# Patient Record
Sex: MALE | Race: BLACK OR AFRICAN AMERICAN | NOT HISPANIC OR LATINO | Employment: FULL TIME | URBAN - METROPOLITAN AREA
[De-identification: names, ages, dates, MRNs, and addresses within clinical notes are randomized per-mention and may not be internally consistent; named-entity substitution may affect disease eponyms.]

---

## 2018-05-30 ENCOUNTER — HISTORICAL (OUTPATIENT)
Dept: RADIOLOGY | Facility: HOSPITAL | Age: 48
End: 2018-05-30

## 2018-11-21 LAB — RAPID GROUP A STREP (OHS): NEGATIVE

## 2018-12-17 LAB
INFLUENZA A ANTIGEN, POC: NEGATIVE
INFLUENZA B ANTIGEN, POC: NEGATIVE
RAPID GROUP A STREP (OHS): NEGATIVE

## 2021-05-08 LAB
INFLUENZA A ANTIGEN, POC: POSITIVE
INFLUENZA B ANTIGEN, POC: NEGATIVE
RAPID GROUP A STREP (OHS): NEGATIVE
SARS-COV-2 RNA RESP QL NAA+PROBE: NEGATIVE

## 2021-09-10 ENCOUNTER — HISTORICAL (OUTPATIENT)
Dept: ADMINISTRATIVE | Facility: HOSPITAL | Age: 51
End: 2021-09-10

## 2021-09-10 LAB — SARS-COV-2 RNA RESP QL NAA+PROBE: NEGATIVE

## 2021-11-22 LAB
INFLUENZA A ANTIGEN, POC: NEGATIVE
INFLUENZA B ANTIGEN, POC: NEGATIVE
SARS-COV-2 RNA RESP QL NAA+PROBE: NEGATIVE

## 2022-04-11 ENCOUNTER — HISTORICAL (OUTPATIENT)
Dept: ADMINISTRATIVE | Facility: HOSPITAL | Age: 52
End: 2022-04-11

## 2022-04-28 VITALS
BODY MASS INDEX: 31.5 KG/M2 | OXYGEN SATURATION: 98 % | SYSTOLIC BLOOD PRESSURE: 119 MMHG | DIASTOLIC BLOOD PRESSURE: 74 MMHG | HEIGHT: 70 IN | WEIGHT: 220 LBS

## 2022-07-24 ENCOUNTER — OFFICE VISIT (OUTPATIENT)
Dept: URGENT CARE | Facility: CLINIC | Age: 52
End: 2022-07-24
Payer: COMMERCIAL

## 2022-07-24 VITALS
RESPIRATION RATE: 20 BRPM | WEIGHT: 222 LBS | TEMPERATURE: 102 F | HEART RATE: 89 BPM | HEIGHT: 70 IN | SYSTOLIC BLOOD PRESSURE: 143 MMHG | BODY MASS INDEX: 31.78 KG/M2 | DIASTOLIC BLOOD PRESSURE: 64 MMHG | OXYGEN SATURATION: 97 %

## 2022-07-24 DIAGNOSIS — R06.6 HICCUPS: ICD-10-CM

## 2022-07-24 DIAGNOSIS — U07.1 COVID-19: ICD-10-CM

## 2022-07-24 DIAGNOSIS — U07.1 COVID-19 VIRUS DETECTED: ICD-10-CM

## 2022-07-24 DIAGNOSIS — R50.9 FEVER, UNSPECIFIED FEVER CAUSE: Primary | ICD-10-CM

## 2022-07-24 LAB
CTP QC/QA: YES
CTP QC/QA: YES
FLUAV AG NPH QL: NEGATIVE
FLUBV AG NPH QL: NEGATIVE
SARS-COV-2 RDRP RESP QL NAA+PROBE: POSITIVE

## 2022-07-24 PROCEDURE — 3008F PR BODY MASS INDEX (BMI) DOCUMENTED: ICD-10-PCS | Mod: CPTII,,, | Performed by: PHYSICIAN ASSISTANT

## 2022-07-24 PROCEDURE — 3077F SYST BP >= 140 MM HG: CPT | Mod: CPTII,,, | Performed by: PHYSICIAN ASSISTANT

## 2022-07-24 PROCEDURE — 99202 PR OFFICE/OUTPT VISIT, NEW, LEVL II, 15-29 MIN: ICD-10-PCS | Mod: 25,,, | Performed by: PHYSICIAN ASSISTANT

## 2022-07-24 PROCEDURE — U0002: ICD-10-PCS | Mod: QW,,, | Performed by: PHYSICIAN ASSISTANT

## 2022-07-24 PROCEDURE — 3078F PR MOST RECENT DIASTOLIC BLOOD PRESSURE < 80 MM HG: ICD-10-PCS | Mod: CPTII,,, | Performed by: PHYSICIAN ASSISTANT

## 2022-07-24 PROCEDURE — 96372 PR INJECTION,THERAP/PROPH/DIAG2ST, IM OR SUBCUT: ICD-10-PCS | Mod: ,,, | Performed by: PHYSICIAN ASSISTANT

## 2022-07-24 PROCEDURE — 1160F RVW MEDS BY RX/DR IN RCRD: CPT | Mod: CPTII,,, | Performed by: PHYSICIAN ASSISTANT

## 2022-07-24 PROCEDURE — 87804 POCT INFLUENZA A/B: ICD-10-PCS | Mod: 59,QW,, | Performed by: PHYSICIAN ASSISTANT

## 2022-07-24 PROCEDURE — 3008F BODY MASS INDEX DOCD: CPT | Mod: CPTII,,, | Performed by: PHYSICIAN ASSISTANT

## 2022-07-24 PROCEDURE — U0002 COVID-19 LAB TEST NON-CDC: HCPCS | Mod: QW,,, | Performed by: PHYSICIAN ASSISTANT

## 2022-07-24 PROCEDURE — 3077F PR MOST RECENT SYSTOLIC BLOOD PRESSURE >= 140 MM HG: ICD-10-PCS | Mod: CPTII,,, | Performed by: PHYSICIAN ASSISTANT

## 2022-07-24 PROCEDURE — 3078F DIAST BP <80 MM HG: CPT | Mod: CPTII,,, | Performed by: PHYSICIAN ASSISTANT

## 2022-07-24 PROCEDURE — 87804 INFLUENZA ASSAY W/OPTIC: CPT | Mod: QW,,, | Performed by: PHYSICIAN ASSISTANT

## 2022-07-24 PROCEDURE — 1159F MED LIST DOCD IN RCRD: CPT | Mod: CPTII,,, | Performed by: PHYSICIAN ASSISTANT

## 2022-07-24 PROCEDURE — 1159F PR MEDICATION LIST DOCUMENTED IN MEDICAL RECORD: ICD-10-PCS | Mod: CPTII,,, | Performed by: PHYSICIAN ASSISTANT

## 2022-07-24 PROCEDURE — 99202 OFFICE O/P NEW SF 15 MIN: CPT | Mod: 25,,, | Performed by: PHYSICIAN ASSISTANT

## 2022-07-24 PROCEDURE — 96372 THER/PROPH/DIAG INJ SC/IM: CPT | Mod: ,,, | Performed by: PHYSICIAN ASSISTANT

## 2022-07-24 PROCEDURE — 1160F PR REVIEW ALL MEDS BY PRESCRIBER/CLIN PHARMACIST DOCUMENTED: ICD-10-PCS | Mod: CPTII,,, | Performed by: PHYSICIAN ASSISTANT

## 2022-07-24 RX ORDER — METHYLPREDNISOLONE 4 MG/1
TABLET ORAL
Qty: 1 EACH | Refills: 0 | Status: SHIPPED | OUTPATIENT
Start: 2022-07-24 | End: 2023-10-20

## 2022-07-24 RX ORDER — DEXAMETHASONE SODIUM PHOSPHATE 100 MG/10ML
10 INJECTION INTRAMUSCULAR; INTRAVENOUS
Status: COMPLETED | OUTPATIENT
Start: 2022-07-24 | End: 2022-07-24

## 2022-07-24 RX ORDER — CHLORPROMAZINE HYDROCHLORIDE 25 MG/1
25 TABLET, FILM COATED ORAL 3 TIMES DAILY PRN
Qty: 9 TABLET | Refills: 0 | Status: SHIPPED | OUTPATIENT
Start: 2022-07-24 | End: 2022-07-27

## 2022-07-24 RX ORDER — LISINOPRIL 30 MG/1
30 TABLET ORAL DAILY
COMMUNITY

## 2022-07-24 RX ORDER — TRAZODONE HYDROCHLORIDE 100 MG/1
100 TABLET ORAL NIGHTLY
COMMUNITY

## 2022-07-24 RX ORDER — ZOLPIDEM TARTRATE 10 MG/1
5 TABLET ORAL NIGHTLY PRN
COMMUNITY

## 2022-07-24 RX ORDER — HYDROCHLOROTHIAZIDE 25 MG/1
25 TABLET ORAL DAILY
COMMUNITY
End: 2023-10-20

## 2022-07-24 RX ADMIN — DEXAMETHASONE SODIUM PHOSPHATE 10 MG: 100 INJECTION INTRAMUSCULAR; INTRAVENOUS at 03:07

## 2022-07-24 NOTE — PATIENT INSTRUCTIONS
COVID Positive  Start vitamin C 1000mg twice a day, zinc 100mg once a day, and vitamin D3 at least 2000 units a day. Current CDC guidelines recommend that you quarantine for 5 days starting the day after your symptoms began. Quarantine can end after 5 days as long as the last 24 hours of quarantine you are fever free and there is improvement of all your symptoms. Wear a mask around others for 5 additional days after quarantine. Treat your symptoms as you would the common cold. If you live with anybody, isolate yourself in a separate bedroom and use a separate bathroom. If your symptoms worsen or you develop shortness of breath or a fever over 102.5 , seek medical attention immediately.    Alternate Tylenol and ibuprofen every 4-6 hours to help reduce use aches pain fever and chills.  Medrol Dosepak starting tomorrow to help reduce congestion inflammation.  Chlorpromazine   Sparingly lows dose is needed for severe hiccups.   Medication may cause drowsiness do not take and drive or operate machinery.  Recommend follow-up with primary care physician in 5-7 days for re-evaluation if not improving.

## 2022-07-24 NOTE — PROGRESS NOTES
"Subjective:       Patient ID: Hang Mix is a 51 y.o. male.    Vitals:  height is 5' 10" (1.778 m) and weight is 100.7 kg (222 lb). His oral temperature is 102.2 °F (39 °C) (abnormal). His blood pressure is 143/64 (abnormal) and his pulse is 89. His respiration is 20 and oxygen saturation is 97%.     Chief Complaint: Sinus Problem (Started yesterday )    HPI  Male with viral sick contaqct at work 4 days ago now with sinus congestion onset yesterday, body aches and fever with hiccups today       Sinus Problem  The maximum temperature recorded prior to his arrival was 101 - 101.9 F. Associated symptoms include chills, congestion, coughing and headaches. Pertinent negatives include no ear pain, neck pain or shortness of breath.       Constitution: Positive for chills and fever.   HENT: Positive for congestion. Negative for ear pain, sinus pain, trouble swallowing and voice change.    Neck: Negative for neck pain and neck stiffness.   Cardiovascular: Negative.    Respiratory: Positive for cough. Negative for shortness of breath and wheezing.    Gastrointestinal: Negative for vomiting and diarrhea.   Musculoskeletal: Positive for muscle ache.   Skin: Negative.    Allergic/Immunologic: Negative.    Neurological: Positive for headaches. Negative for dizziness and passing out.       Objective:      Physical Exam   Constitutional: He is oriented to person, place, and time. He appears well-developed. He is cooperative.  Non-toxic appearance. He does not appear ill. No distress.      Comments:Pleasant male speaks in complete sentences with active hiccups     HENT:   Head: Normocephalic.   Ears:   Right Ear: Hearing, tympanic membrane, external ear and ear canal normal.   Left Ear: Hearing, tympanic membrane, external ear and ear canal normal.   Nose: Congestion present. No mucosal edema, rhinorrhea or nasal deformity. No epistaxis. Right sinus exhibits no maxillary sinus tenderness and no frontal sinus tenderness. " Left sinus exhibits no maxillary sinus tenderness and no frontal sinus tenderness.   Mouth/Throat: Uvula is midline, oropharynx is clear and moist and mucous membranes are normal. Mucous membranes are moist. No trismus in the jaw. Normal dentition. No uvula swelling. No oropharyngeal exudate, posterior oropharyngeal edema or posterior oropharyngeal erythema.   Eyes: Conjunctivae and lids are normal. No scleral icterus.   Neck: Trachea normal and phonation normal. Neck supple. No edema present. No erythema present. No neck rigidity present.   Cardiovascular: Normal rate, regular rhythm, normal heart sounds and normal pulses.   Pulmonary/Chest: Effort normal and breath sounds normal. No stridor. He has no decreased breath sounds. He has no wheezes. He has no rhonchi. He has no rales.   Abdominal: Normal appearance.   Musculoskeletal: Normal range of motion.         General: No swelling. Normal range of motion.   Neurological: He is alert and oriented to person, place, and time. He exhibits normal muscle tone. Coordination normal.   Skin: Skin is warm, dry, intact and not diaphoretic.   Psychiatric: His speech is normal and behavior is normal. Judgment and thought content normal.   Nursing note and vitals reviewed.             Previous History      Review of patient's allergies indicates:  No Known Allergies    Past Medical History:   Diagnosis Date    Hypertension     Insomnia      Current Outpatient Medications   Medication Instructions    chlorproMAZINE (THORAZINE) 25 mg, Oral, 3 times daily PRN    hydroCHLOROthiazide (HYDRODIURIL) 25 mg, Oral, Daily    lisinopriL (PRINIVIL,ZESTRIL) 30 mg, Oral, Daily    methylPREDNISolone (MEDROL DOSEPACK) 4 mg tablet use as directed    traZODone (DESYREL) 100 mg, Oral, Nightly    zolpidem (AMBIEN) 5 mg, Oral, Nightly PRN     History reviewed. No pertinent surgical history.  Family History   Problem Relation Age of Onset    Diabetes type II Father        Social History  "    Tobacco Use    Smoking status: Never Smoker    Smokeless tobacco: Never Used        Physical Exam      Vital Signs Reviewed   BP (!) 143/64   Pulse 89   Temp (!) 102.2 °F (39 °C) (Oral)   Resp 20   Ht 5' 10" (1.778 m)   Wt 100.7 kg (222 lb)   SpO2 97%   BMI 31.85 kg/m²        Procedures    Procedures     Labs     Results for orders placed or performed in visit on 07/24/22   POCT COVID-19 Rapid Screening   Result Value Ref Range    POC Rapid COVID Positive (A) Negative     Acceptable Yes    POCT Influenza A/B   Result Value Ref Range    Rapid Influenza A Ag Negative Negative    Rapid Influenza B Ag Negative Negative     Acceptable Yes        Assessment:       1. Fever, unspecified fever cause    2. COVID-19    3. Hiccups          Plan:         Patient declines Tylenol wall and clinic for fever.  Patient stands positive viral testing concern for COVID infection.  Patient with spasmodic active hiccups offered and accepts prescription medication.     Patient readyfor discharge in stands home quarantine OTC symptomatic Rx and follow-up.  Fever, unspecified fever cause  -     POCT COVID-19 Rapid Screening  -     POCT Influenza A/B    COVID-19  -     methylPREDNISolone (MEDROL DOSEPACK) 4 mg tablet; use as directed  Dispense: 1 each; Refill: 0    Hiccups  -     chlorproMAZINE (THORAZINE) 25 MG tablet; Take 1 tablet (25 mg total) by mouth 3 (three) times daily as needed (hiccups).  Dispense: 9 tablet; Refill: 0    Other orders  -     dexamethasone injection 10 mg                   "

## 2022-09-21 ENCOUNTER — HISTORICAL (OUTPATIENT)
Dept: ADMINISTRATIVE | Facility: HOSPITAL | Age: 52
End: 2022-09-21
Payer: COMMERCIAL

## 2023-01-14 ENCOUNTER — OFFICE VISIT (OUTPATIENT)
Dept: URGENT CARE | Facility: CLINIC | Age: 53
End: 2023-01-14
Payer: COMMERCIAL

## 2023-01-14 VITALS
DIASTOLIC BLOOD PRESSURE: 81 MMHG | TEMPERATURE: 99 F | HEIGHT: 70 IN | OXYGEN SATURATION: 96 % | RESPIRATION RATE: 18 BRPM | BODY MASS INDEX: 31.92 KG/M2 | HEART RATE: 76 BPM | SYSTOLIC BLOOD PRESSURE: 144 MMHG | WEIGHT: 223 LBS

## 2023-01-14 DIAGNOSIS — R09.81 NASAL CONGESTION: ICD-10-CM

## 2023-01-14 DIAGNOSIS — R05.9 COUGH, UNSPECIFIED TYPE: Primary | ICD-10-CM

## 2023-01-14 DIAGNOSIS — J02.9 SORE THROAT: ICD-10-CM

## 2023-01-14 DIAGNOSIS — J06.9 VIRAL UPPER RESPIRATORY TRACT INFECTION: ICD-10-CM

## 2023-01-14 LAB
CTP QC/QA: YES
FLUAV AG NPH QL: NEGATIVE
FLUBV AG NPH QL: NEGATIVE
S PYO RRNA THROAT QL PROBE: NEGATIVE
SARS-COV-2 RDRP RESP QL NAA+PROBE: NEGATIVE

## 2023-01-14 PROCEDURE — 3077F PR MOST RECENT SYSTOLIC BLOOD PRESSURE >= 140 MM HG: ICD-10-PCS | Mod: CPTII,,, | Performed by: NURSE PRACTITIONER

## 2023-01-14 PROCEDURE — 1159F MED LIST DOCD IN RCRD: CPT | Mod: CPTII,,, | Performed by: NURSE PRACTITIONER

## 2023-01-14 PROCEDURE — 87804 POCT INFLUENZA A/B: ICD-10-PCS | Mod: QW,,, | Performed by: NURSE PRACTITIONER

## 2023-01-14 PROCEDURE — 3079F PR MOST RECENT DIASTOLIC BLOOD PRESSURE 80-89 MM HG: ICD-10-PCS | Mod: CPTII,,, | Performed by: NURSE PRACTITIONER

## 2023-01-14 PROCEDURE — 96372 THER/PROPH/DIAG INJ SC/IM: CPT | Mod: ,,, | Performed by: NURSE PRACTITIONER

## 2023-01-14 PROCEDURE — 99214 OFFICE O/P EST MOD 30 MIN: CPT | Mod: 25,,, | Performed by: NURSE PRACTITIONER

## 2023-01-14 PROCEDURE — 96372 PR INJECTION,THERAP/PROPH/DIAG2ST, IM OR SUBCUT: ICD-10-PCS | Mod: ,,, | Performed by: NURSE PRACTITIONER

## 2023-01-14 PROCEDURE — 3008F PR BODY MASS INDEX (BMI) DOCUMENTED: ICD-10-PCS | Mod: CPTII,,, | Performed by: NURSE PRACTITIONER

## 2023-01-14 PROCEDURE — 87635: ICD-10-PCS | Mod: QW,,, | Performed by: NURSE PRACTITIONER

## 2023-01-14 PROCEDURE — 3008F BODY MASS INDEX DOCD: CPT | Mod: CPTII,,, | Performed by: NURSE PRACTITIONER

## 2023-01-14 PROCEDURE — 87880 STREP A ASSAY W/OPTIC: CPT | Mod: QW,,, | Performed by: NURSE PRACTITIONER

## 2023-01-14 PROCEDURE — 87804 INFLUENZA ASSAY W/OPTIC: CPT | Mod: QW,,, | Performed by: NURSE PRACTITIONER

## 2023-01-14 PROCEDURE — 3077F SYST BP >= 140 MM HG: CPT | Mod: CPTII,,, | Performed by: NURSE PRACTITIONER

## 2023-01-14 PROCEDURE — 1159F PR MEDICATION LIST DOCUMENTED IN MEDICAL RECORD: ICD-10-PCS | Mod: CPTII,,, | Performed by: NURSE PRACTITIONER

## 2023-01-14 PROCEDURE — 87635 SARS-COV-2 COVID-19 AMP PRB: CPT | Mod: QW,,, | Performed by: NURSE PRACTITIONER

## 2023-01-14 PROCEDURE — 3079F DIAST BP 80-89 MM HG: CPT | Mod: CPTII,,, | Performed by: NURSE PRACTITIONER

## 2023-01-14 PROCEDURE — 1160F PR REVIEW ALL MEDS BY PRESCRIBER/CLIN PHARMACIST DOCUMENTED: ICD-10-PCS | Mod: CPTII,,, | Performed by: NURSE PRACTITIONER

## 2023-01-14 PROCEDURE — 99214 PR OFFICE/OUTPT VISIT, EST, LEVL IV, 30-39 MIN: ICD-10-PCS | Mod: 25,,, | Performed by: NURSE PRACTITIONER

## 2023-01-14 PROCEDURE — 1160F RVW MEDS BY RX/DR IN RCRD: CPT | Mod: CPTII,,, | Performed by: NURSE PRACTITIONER

## 2023-01-14 PROCEDURE — 87880 POCT RAPID STREP A: ICD-10-PCS | Mod: QW,,, | Performed by: NURSE PRACTITIONER

## 2023-01-14 RX ORDER — ANASTROZOLE 1 MG/1
1 TABLET ORAL
COMMUNITY
Start: 2022-12-20

## 2023-01-14 RX ORDER — BETAMETHASONE SODIUM PHOSPHATE AND BETAMETHASONE ACETATE 3; 3 MG/ML; MG/ML
12 INJECTION, SUSPENSION INTRA-ARTICULAR; INTRALESIONAL; INTRAMUSCULAR; SOFT TISSUE
Status: COMPLETED | OUTPATIENT
Start: 2023-01-14 | End: 2023-01-14

## 2023-01-14 RX ORDER — CELECOXIB 200 MG/1
200 CAPSULE ORAL DAILY PRN
COMMUNITY
Start: 2022-11-22 | End: 2023-10-19

## 2023-01-14 RX ORDER — TESTOSTERONE CYPIONATE 200 MG/ML
200 INJECTION, SOLUTION INTRAMUSCULAR
COMMUNITY
Start: 2022-12-27

## 2023-01-14 RX ADMIN — BETAMETHASONE SODIUM PHOSPHATE AND BETAMETHASONE ACETATE 12 MG: 3; 3 INJECTION, SUSPENSION INTRA-ARTICULAR; INTRALESIONAL; INTRAMUSCULAR; SOFT TISSUE at 01:01

## 2023-01-14 NOTE — PATIENT INSTRUCTIONS
Nasal saline, Flonase nasal spray, Claritin OTC as directed  take Mucinex OTC for cough as directed   follow up with your primary care provider within 2-5 days if your signs and symptoms have not resolved or worsen.   If condition worsens or fails to improve we recommend that you receive another evaluation with your primary medical clinic to discuss your concerns.

## 2023-01-14 NOTE — LETTER
January 14, 2023      [facility] [place]  [address]  [place] [address]  Phone: [phone]       Patient: Hang Mix   YOB: 1970  Date of Visit: 01/14/2023    To Whom It May Concern:    Tania Mix  was at [facility] on 01/14/2023. The patient may return to work/school on 01/16/2023 with no restrictions. If you have any questions or concerns, or if I can be of further assistance, please do not hesitate to contact me.    Sincerely,    Sherry Eden LPN

## 2023-01-14 NOTE — PROGRESS NOTES
"Subjective:       Patient ID: Hang Mix is a 52 y.o. male.    Vitals:  height is 5' 10" (1.778 m) and weight is 101.2 kg (223 lb). His oral temperature is 98.7 °F (37.1 °C). His blood pressure is 144/81 (abnormal) and his pulse is 76. His respiration is 18 and oxygen saturation is 96%.     Chief Complaint: Cough (Cough, sore throat, sinus drip x one week. )    52-year-old male presents with sore throat, sinus congestion, and mild cough.  Onset 1 week ago.  No shortness of breath or fever    HENT:  Positive for postnasal drip and sore throat.    Respiratory:  Positive for cough.      Objective:      Physical Exam   Constitutional: He is oriented to person, place, and time. He appears well-developed. He is cooperative.  Non-toxic appearance. He does not appear ill. No distress.   HENT:   Head: Normocephalic and atraumatic.   Ears:   Right Ear: Hearing, tympanic membrane, external ear and ear canal normal.   Left Ear: Hearing, tympanic membrane, external ear and ear canal normal.   Nose: Nose normal. No mucosal edema, rhinorrhea or nasal deformity. No epistaxis. Right sinus exhibits no maxillary sinus tenderness and no frontal sinus tenderness. Left sinus exhibits no maxillary sinus tenderness and no frontal sinus tenderness.   Mouth/Throat: Uvula is midline, oropharynx is clear and moist and mucous membranes are normal. No trismus in the jaw. Normal dentition. No uvula swelling. No oropharyngeal exudate, posterior oropharyngeal edema or posterior oropharyngeal erythema.   Eyes: Conjunctivae and lids are normal. No scleral icterus.   Neck: Trachea normal and phonation normal. Neck supple. No edema present. No erythema present. No neck rigidity present.   Cardiovascular: Normal rate, regular rhythm, normal heart sounds and normal pulses.   Pulmonary/Chest: Effort normal and breath sounds normal. No respiratory distress. He has no decreased breath sounds. He has no rhonchi.   Abdominal: Normal appearance. "   Musculoskeletal: Normal range of motion.         General: No deformity. Normal range of motion.   Neurological: He is alert and oriented to person, place, and time. He exhibits normal muscle tone. Coordination normal.   Skin: Skin is warm, dry, intact, not diaphoretic and not pale.   Psychiatric: His speech is normal and behavior is normal. Judgment and thought content normal.   Nursing note and vitals reviewed.      Assessment:       1. Cough, unspecified type    2. Nasal congestion    3. Sore throat    4. Viral upper respiratory tract infection          Office Visit on 01/14/2023   Component Date Value Ref Range Status    POC Rapid COVID 01/14/2023 Negative  Negative Final     Acceptable 01/14/2023 Yes   Final    Rapid Influenza A Ag 01/14/2023 Negative  Negative Final    Rapid Influenza B Ag 01/14/2023 Negative  Negative Final     Acceptable 01/14/2023 Yes   Final    Rapid Strep A Screen 01/14/2023 Negative  Negative Final     Acceptable 01/14/2023 Yes   Final        Plan:     Nasal saline, Flonase nasal spray, Claritin OTC as directed  take Mucinex OTC for cough as directed   follow up with your primary care provider within 2-5 days if your signs and symptoms have not resolved or worsen.   If condition worsens or fails to improve we recommend that you receive another evaluation with your primary medical clinic to discuss your concerns.      Cough, unspecified type  -     POCT COVID-19 Rapid Screening  -     POCT Influenza A/B    Nasal congestion  -     POCT COVID-19 Rapid Screening  -     POCT Influenza A/B    Sore throat  -     POCT COVID-19 Rapid Screening  -     POCT Influenza A/B  -     POCT rapid strep A    Viral upper respiratory tract infection    Other orders  -     betamethasone acetate-betamethasone sodium phosphate injection 12 mg

## 2023-04-19 ENCOUNTER — PATIENT MESSAGE (OUTPATIENT)
Dept: RESEARCH | Facility: HOSPITAL | Age: 53
End: 2023-04-19
Payer: COMMERCIAL

## 2023-07-13 ENCOUNTER — OFFICE VISIT (OUTPATIENT)
Dept: ORTHOPEDICS | Facility: CLINIC | Age: 53
End: 2023-07-13
Payer: COMMERCIAL

## 2023-07-13 ENCOUNTER — HOSPITAL ENCOUNTER (OUTPATIENT)
Dept: RADIOLOGY | Facility: CLINIC | Age: 53
Discharge: HOME | End: 2023-07-13
Attending: ORTHOPAEDIC SURGERY
Payer: COMMERCIAL

## 2023-07-13 VITALS
WEIGHT: 223 LBS | BODY MASS INDEX: 31.92 KG/M2 | TEMPERATURE: 98 F | HEART RATE: 62 BPM | HEIGHT: 70 IN | SYSTOLIC BLOOD PRESSURE: 157 MMHG | DIASTOLIC BLOOD PRESSURE: 96 MMHG

## 2023-07-13 DIAGNOSIS — M25.561 ACUTE PAIN OF RIGHT KNEE: ICD-10-CM

## 2023-07-13 DIAGNOSIS — M23.203 DEGENERATIVE TEAR OF MEDIAL MENISCUS OF RIGHT KNEE: ICD-10-CM

## 2023-07-13 DIAGNOSIS — M17.0 PRIMARY OSTEOARTHRITIS OF BOTH KNEES: Primary | ICD-10-CM

## 2023-07-13 PROCEDURE — 1160F RVW MEDS BY RX/DR IN RCRD: CPT | Mod: CPTII,,, | Performed by: ORTHOPAEDIC SURGERY

## 2023-07-13 PROCEDURE — 20610 LARGE JOINT ASPIRATION/INJECTION: BILATERAL KNEE: ICD-10-PCS | Mod: 50,,, | Performed by: ORTHOPAEDIC SURGERY

## 2023-07-13 PROCEDURE — 73564 XR KNEE COMP 4 OR MORE VIEWS RIGHT: ICD-10-PCS | Mod: RT,,, | Performed by: ORTHOPAEDIC SURGERY

## 2023-07-13 PROCEDURE — 3080F PR MOST RECENT DIASTOLIC BLOOD PRESSURE >= 90 MM HG: ICD-10-PCS | Mod: CPTII,,, | Performed by: ORTHOPAEDIC SURGERY

## 2023-07-13 PROCEDURE — 3008F BODY MASS INDEX DOCD: CPT | Mod: CPTII,,, | Performed by: ORTHOPAEDIC SURGERY

## 2023-07-13 PROCEDURE — 1160F PR REVIEW ALL MEDS BY PRESCRIBER/CLIN PHARMACIST DOCUMENTED: ICD-10-PCS | Mod: CPTII,,, | Performed by: ORTHOPAEDIC SURGERY

## 2023-07-13 PROCEDURE — 4010F PR ACE/ARB THEARPY RXD/TAKEN: ICD-10-PCS | Mod: CPTII,,, | Performed by: ORTHOPAEDIC SURGERY

## 2023-07-13 PROCEDURE — 1159F MED LIST DOCD IN RCRD: CPT | Mod: CPTII,,, | Performed by: ORTHOPAEDIC SURGERY

## 2023-07-13 PROCEDURE — 99204 OFFICE O/P NEW MOD 45 MIN: CPT | Mod: 25,,, | Performed by: ORTHOPAEDIC SURGERY

## 2023-07-13 PROCEDURE — 20610 DRAIN/INJ JOINT/BURSA W/O US: CPT | Mod: 50,,, | Performed by: ORTHOPAEDIC SURGERY

## 2023-07-13 PROCEDURE — 73564 X-RAY EXAM KNEE 4 OR MORE: CPT | Mod: RT,,, | Performed by: ORTHOPAEDIC SURGERY

## 2023-07-13 PROCEDURE — 3080F DIAST BP >= 90 MM HG: CPT | Mod: CPTII,,, | Performed by: ORTHOPAEDIC SURGERY

## 2023-07-13 PROCEDURE — 99204 PR OFFICE/OUTPT VISIT, NEW, LEVL IV, 45-59 MIN: ICD-10-PCS | Mod: 25,,, | Performed by: ORTHOPAEDIC SURGERY

## 2023-07-13 PROCEDURE — 3077F SYST BP >= 140 MM HG: CPT | Mod: CPTII,,, | Performed by: ORTHOPAEDIC SURGERY

## 2023-07-13 PROCEDURE — 1159F PR MEDICATION LIST DOCUMENTED IN MEDICAL RECORD: ICD-10-PCS | Mod: CPTII,,, | Performed by: ORTHOPAEDIC SURGERY

## 2023-07-13 PROCEDURE — 4010F ACE/ARB THERAPY RXD/TAKEN: CPT | Mod: CPTII,,, | Performed by: ORTHOPAEDIC SURGERY

## 2023-07-13 PROCEDURE — 3077F PR MOST RECENT SYSTOLIC BLOOD PRESSURE >= 140 MM HG: ICD-10-PCS | Mod: CPTII,,, | Performed by: ORTHOPAEDIC SURGERY

## 2023-07-13 PROCEDURE — 3008F PR BODY MASS INDEX (BMI) DOCUMENTED: ICD-10-PCS | Mod: CPTII,,, | Performed by: ORTHOPAEDIC SURGERY

## 2023-07-13 RX ORDER — LIDOCAINE HYDROCHLORIDE 20 MG/ML
2 INJECTION, SOLUTION INFILTRATION; PERINEURAL
Status: DISCONTINUED | OUTPATIENT
Start: 2023-07-13 | End: 2023-07-13 | Stop reason: HOSPADM

## 2023-07-13 RX ORDER — AMLODIPINE BESYLATE 5 MG/1
5 TABLET ORAL
COMMUNITY
Start: 2023-06-12

## 2023-07-13 RX ORDER — BETAMETHASONE SODIUM PHOSPHATE AND BETAMETHASONE ACETATE 3; 3 MG/ML; MG/ML
6 INJECTION, SUSPENSION INTRA-ARTICULAR; INTRALESIONAL; INTRAMUSCULAR; SOFT TISSUE
Status: DISCONTINUED | OUTPATIENT
Start: 2023-07-13 | End: 2023-07-13 | Stop reason: HOSPADM

## 2023-07-13 RX ADMIN — BETAMETHASONE SODIUM PHOSPHATE AND BETAMETHASONE ACETATE 6 MG: 3; 3 INJECTION, SUSPENSION INTRA-ARTICULAR; INTRALESIONAL; INTRAMUSCULAR; SOFT TISSUE at 09:07

## 2023-07-13 RX ADMIN — LIDOCAINE HYDROCHLORIDE 2 MG: 20 INJECTION, SOLUTION INFILTRATION; PERINEURAL at 09:07

## 2023-07-13 NOTE — PROGRESS NOTES
[facility] Clinic  Orthopedic Clinic Note      Chief Complaint:   Chief Complaint   Patient presents with    Right Knee - Pain    Pain     New patient here with Right knee pain overtime. Particiapted in some sports at [employer]. Had troubles in past with Meniscus - No treatment. Xrays today.   mn     Referring Physician: No ref. provider found      History of Present Illness:    This is a 52 y.o. year old male presenting with complaints of right knee pain worsening for the last few years.  This knee pain is moderate to severe.  Patient states pain is worse with ambulation and activity, especially squats and lunges.  Patient states pain is global.  He takes prescribed Celebrex and is quite active in a home exercise program with continued symptoms.      Past Medical History:   Diagnosis Date    Hypertension     Insomnia        Past Surgical History:   Procedure Laterality Date    COLONOSCOPY         Current Outpatient Medications   Medication Sig    amLODIPine (NORVASC) 5 MG tablet Take 5 mg by mouth.    anastrozole (ARIMIDEX) 1 mg Tab Take 1 mg by mouth twice a week.    celecoxib (CELEBREX) 200 MG capsule Take 200 mg by mouth daily as needed.    lisinopriL (PRINIVIL,ZESTRIL) 30 MG tablet Take 30 mg by mouth once daily.    testosterone cypionate (DEPOTESTOTERONE CYPIONATE) 200 mg/mL injection Inject 200 mg into the muscle every 7 days.    traZODone (DESYREL) 100 MG tablet Take 100 mg by mouth every evening.    zolpidem (AMBIEN) 10 mg Tab Take 5 mg by mouth nightly as needed.    chlorproMAZINE (THORAZINE) 25 MG tablet Take 1 tablet (25 mg total) by mouth 3 (three) times daily as needed (hiccups).    hydroCHLOROthiazide (HYDRODIURIL) 25 MG tablet Take 25 mg by mouth once daily.    methylPREDNISolone (MEDROL DOSEPACK) 4 mg tablet use as directed (Patient not taking: Reported on 1/14/2023)     No current facility-administered medications for this visit.       Review of patient's allergies indicates:  No Known  "Allergies    Family History   Problem Relation Age of Onset    Diabetes type II Father     Diabetes Father     Arthritis Mother     Diabetes Brother        Social History     Socioeconomic History    Marital status:    Tobacco Use    Smoking status: Never     Passive exposure: Never    Smokeless tobacco: Never   Substance and Sexual Activity    Alcohol use: Never    Drug use: Never    Sexual activity: Yes     Partners: Female     Birth control/protection: None           Review of Systems:  All review of systems negative except for those stated in the HPI.    Examination:    Vital Signs:    Vitals:    07/13/23 0858   BP: (!) 157/96   Pulse: 62   Temp: 97.8 °F (36.6 °C)   Weight: 101.2 kg (223 lb)   Height: 5' 10" (1.778 m)       Body mass index is 32 kg/m².    Physical Examination:  General: Well-developed, well-nourished.  Neuro: Alert and oriented x 3.  Psych: Normal mood and affect.  Card: Regular rate and rhythm  Resp: Respirations regular and unlabored  Right Knee Exam:  No obvious deformity. Range of motion from 3-120 degrees. Negative patella grind and equal subluxation of knee cap medial and lateral < 1cm. Negative patella tendon tenderness. Negative Lachman and anterior drawer test. Negative posterior drawer test. Negative varus and valgus stress test.  Positive medial joint line tenderness.  Positive Denise's medial sided knee pain.  Positive deep knee flexion been test with medial sided knee pain.  Negative lateral joint line tenderness. 4/5 strength and normal skin appearance. Sensibility normal.      Imaging: X-rays ordered and images interpreted today personally by me of four views of the right knee demonstrate moderate degenerative changes with joint space narrowing, particularly in the medial aspect of the tibial femoral compartment.      Assessment: Primary osteoarthritis of both knees  -     X-Ray Knee Complete 4 Or More Views Right; Future; Expected date: 07/13/2023  -     Large Joint " Aspiration/Injection: bilateral knee    Degenerative tear of medial meniscus of right knee        Plan:  X-rays were reviewed with the patient.  We discussed multiple options, including continued observation, weight loss, medications, corticosteroid injections, visco-supplementation injections, bracing, and physical therapy.  Plan to proceed with bilateral knee corticosteroid injection today.  This should provide him with some acute pain relief.  He will continue his home exercise program.  Continue previously prescribed Celebrex.  He will return to clinic in approximately 2 months for re-evaluation and left knee x-rays.  He verbalized understanding of the plan of care with no further questions.    Thierry Mortensen MD personally performed the services described in this documentation, including but not limited to patient's history, physical examination, and assessment and plan of care. All medical record entries made by YULISSA Cisneros were performed at his direction and in his presence. The medical record was reviewed and is accurate and complete.    Large Joint Aspiration/Injection: bilateral knee    Date/Time: 7/13/2023 9:15 AM  Performed by: Thierry Mortensen MD  Authorized by: Thierry Mortensen MD     Consent Done?:  Yes (Verbal)  Indications:  Pain  Timeout: prior to procedure the correct patient, procedure, and site was verified    Prep: patient was prepped and draped in usual sterile fashion      Details:  Needle Size:  25 G  Approach:  Anterolateral  Location:  Knee  Laterality:  Bilateral  Site:  Bilateral knee  Medications (Right):  2 mg LIDOcaine HCL 20 mg/ml (2%) 20 mg/mL (2 %); 6 mg betamethasone acetate-betamethasone sodium phosphate 6 mg/mL  Medications (Left):  2 mg LIDOcaine HCL 20 mg/ml (2%) 20 mg/mL (2 %); 6 mg betamethasone acetate-betamethasone sodium phosphate 6 mg/mL  Patient tolerance:  Patient tolerated the procedure well with no immediate complications    Follow up in about 2 months (around  9/13/2023) for Reevaluation.      DISCLAIMER: This note may have been dictated using voice recognition software and may contain grammatical errors.     NOTE: Consult report sent to referring provider via [employer] EMR.

## 2023-07-13 NOTE — PROCEDURES
Large Joint Aspiration/Injection: bilateral knee    Date/Time: 7/13/2023 9:15 AM  Performed by: Thierry Mortensen MD  Authorized by: Thierry Mortensen MD     Consent Done?:  Yes (Verbal)  Indications:  Pain  Timeout: prior to procedure the correct patient, procedure, and site was verified    Prep: patient was prepped and draped in usual sterile fashion      Details:  Needle Size:  25 G  Approach:  Anterolateral  Location:  Knee  Laterality:  Bilateral  Site:  Bilateral knee  Medications (Right):  2 mg LIDOcaine HCL 20 mg/ml (2%) 20 mg/mL (2 %); 6 mg betamethasone acetate-betamethasone sodium phosphate 6 mg/mL  Medications (Left):  2 mg LIDOcaine HCL 20 mg/ml (2%) 20 mg/mL (2 %); 6 mg betamethasone acetate-betamethasone sodium phosphate 6 mg/mL  Patient tolerance:  Patient tolerated the procedure well with no immediate complications

## 2023-09-14 ENCOUNTER — HOSPITAL ENCOUNTER (OUTPATIENT)
Dept: RADIOLOGY | Facility: CLINIC | Age: 53
Discharge: HOME | End: 2023-09-14
Attending: ORTHOPAEDIC SURGERY
Payer: COMMERCIAL

## 2023-09-14 ENCOUNTER — OFFICE VISIT (OUTPATIENT)
Dept: ORTHOPEDICS | Facility: CLINIC | Age: 53
End: 2023-09-14
Payer: COMMERCIAL

## 2023-09-14 VITALS — HEIGHT: 70 IN | BODY MASS INDEX: 31.92 KG/M2 | WEIGHT: 223 LBS

## 2023-09-14 DIAGNOSIS — M17.0 PRIMARY OSTEOARTHRITIS OF BOTH KNEES: Primary | ICD-10-CM

## 2023-09-14 DIAGNOSIS — M17.0 PRIMARY OSTEOARTHRITIS OF BOTH KNEES: ICD-10-CM

## 2023-09-14 PROCEDURE — 4010F ACE/ARB THERAPY RXD/TAKEN: CPT | Mod: CPTII,,, | Performed by: ORTHOPAEDIC SURGERY

## 2023-09-14 PROCEDURE — 1160F RVW MEDS BY RX/DR IN RCRD: CPT | Mod: CPTII,,, | Performed by: ORTHOPAEDIC SURGERY

## 2023-09-14 PROCEDURE — 4010F PR ACE/ARB THEARPY RXD/TAKEN: ICD-10-PCS | Mod: CPTII,,, | Performed by: ORTHOPAEDIC SURGERY

## 2023-09-14 PROCEDURE — 1159F PR MEDICATION LIST DOCUMENTED IN MEDICAL RECORD: ICD-10-PCS | Mod: CPTII,,, | Performed by: ORTHOPAEDIC SURGERY

## 2023-09-14 PROCEDURE — 3008F PR BODY MASS INDEX (BMI) DOCUMENTED: ICD-10-PCS | Mod: CPTII,,, | Performed by: ORTHOPAEDIC SURGERY

## 2023-09-14 PROCEDURE — 99214 OFFICE O/P EST MOD 30 MIN: CPT | Mod: ,,, | Performed by: ORTHOPAEDIC SURGERY

## 2023-09-14 PROCEDURE — 73560 XR KNEE 1 OR 2 VIEW LEFT: ICD-10-PCS | Mod: LT,,, | Performed by: ORTHOPAEDIC SURGERY

## 2023-09-14 PROCEDURE — 1159F MED LIST DOCD IN RCRD: CPT | Mod: CPTII,,, | Performed by: ORTHOPAEDIC SURGERY

## 2023-09-14 PROCEDURE — 73560 X-RAY EXAM OF KNEE 1 OR 2: CPT | Mod: LT,,, | Performed by: ORTHOPAEDIC SURGERY

## 2023-09-14 PROCEDURE — 3008F BODY MASS INDEX DOCD: CPT | Mod: CPTII,,, | Performed by: ORTHOPAEDIC SURGERY

## 2023-09-14 PROCEDURE — 99214 PR OFFICE/OUTPT VISIT, EST, LEVL IV, 30-39 MIN: ICD-10-PCS | Mod: ,,, | Performed by: ORTHOPAEDIC SURGERY

## 2023-09-14 PROCEDURE — 1160F PR REVIEW ALL MEDS BY PRESCRIBER/CLIN PHARMACIST DOCUMENTED: ICD-10-PCS | Mod: CPTII,,, | Performed by: ORTHOPAEDIC SURGERY

## 2023-09-14 RX ORDER — DICLOFENAC SODIUM 50 MG/1
50 TABLET, DELAYED RELEASE ORAL 2 TIMES DAILY PRN
Qty: 60 TABLET | Refills: 0 | Status: SHIPPED | OUTPATIENT
Start: 2023-09-14 | End: 2023-10-19 | Stop reason: SDUPTHER

## 2023-09-14 NOTE — PROGRESS NOTES
Orthopaedic Clinic  Orthopedic Clinic Note      Chief Complaint:   Chief Complaint   Patient presents with    Follow-up     2mo f/u bilateral knee steroid injections 7/13/23. states injections helped for about a week. would like to see about synvisc injections     Referring Physician: No ref. provider found      History of Present Illness:    This is a 52 y.o. year old male presenting with complaints of right knee pain worsening for the last few years.  This knee pain is moderate to severe.  Patient states pain is worse with ambulation and activity, especially squats and lunges.  Patient states pain is global.  He takes prescribed Celebrex and is quite active in a home exercise program with continued symptoms.  09/14/2023 this patient comes in today stating that the corticosteroid injections were not helpful.  He is interested in Synvisc injections.      Past Medical History:   Diagnosis Date    Hypertension     Insomnia        Past Surgical History:   Procedure Laterality Date    COLONOSCOPY         Current Outpatient Medications   Medication Sig    amLODIPine (NORVASC) 5 MG tablet Take 5 mg by mouth.    anastrozole (ARIMIDEX) 1 mg Tab Take 1 mg by mouth twice a week.    celecoxib (CELEBREX) 200 MG capsule Take 200 mg by mouth daily as needed.    lisinopriL (PRINIVIL,ZESTRIL) 30 MG tablet Take 30 mg by mouth once daily.    testosterone cypionate (DEPOTESTOTERONE CYPIONATE) 200 mg/mL injection Inject 200 mg into the muscle every 7 days.    traZODone (DESYREL) 100 MG tablet Take 100 mg by mouth every evening.    zolpidem (AMBIEN) 10 mg Tab Take 5 mg by mouth nightly as needed.    chlorproMAZINE (THORAZINE) 25 MG tablet Take 1 tablet (25 mg total) by mouth 3 (three) times daily as needed (hiccups).    hydroCHLOROthiazide (HYDRODIURIL) 25 MG tablet Take 25 mg by mouth once daily.    methylPREDNISolone (MEDROL DOSEPACK) 4 mg tablet use as directed (Patient not taking: Reported on 1/14/2023)     No current  "facility-administered medications for this visit.       Review of patient's allergies indicates:  No Known Allergies    Family History   Problem Relation Age of Onset    Diabetes type II Father     Diabetes Father     Arthritis Mother     Diabetes Brother        Social History     Socioeconomic History    Marital status:    Tobacco Use    Smoking status: Never     Passive exposure: Never    Smokeless tobacco: Never   Substance and Sexual Activity    Alcohol use: Never    Drug use: Never    Sexual activity: Yes     Partners: Female     Birth control/protection: None           Review of Systems:  All review of systems negative except for those stated in the HPI.    Examination:    Vital Signs:    Vitals:    09/14/23 0841   Weight: 101.2 kg (223 lb)   Height: 5' 10" (1.778 m)       Body mass index is 32 kg/m².    Physical Examination:  General: Well-developed, well-nourished.  Neuro: Alert and oriented x 3.  Psych: Normal mood and affect.  Card: Regular rate and rhythm  Resp: Respirations regular and unlabored  Right Knee Exam:  No obvious deformity. Range of motion from 3-120 degrees. Negative patella grind and equal subluxation of knee cap medial and lateral < 1cm. Negative patella tendon tenderness. Negative Lachman and anterior drawer test. Negative posterior drawer test. Negative varus and valgus stress test.  Positive medial joint line tenderness.  Positive Denise's medial sided knee pain.  Positive deep knee flexion been test with medial sided knee pain.  Negative lateral joint line tenderness. 4/5 strength and normal skin appearance. Sensibility normal.      Imaging: X-rays ordered and images interpreted today personally by me of four views of the right knee demonstrate moderate degenerative changes with joint space narrowing, particularly in the medial aspect of the tibial femoral compartment.      Assessment: Primary osteoarthritis of both knees  -     Cancel: X-Ray Knee Complete 4 or More Views " Left; Future; Expected date: 09/14/2023  -     Prior authorization Order        Plan:  This patient will discontinue Celebrex and I will start him on diclofenac.  He only take it as needed.  We will bring him back in a few weeks to do bilateral Synvisc injections.        Thierry Mortensen MD personally performed the services described in this documentation, including but not limited to patient's history, physical examination, and assessment and plan of care. All medical record entries made by YULISSA Cisneros were performed at his direction and in his presence. The medical record was reviewed and is accurate and complete.         No follow-ups on file.      DISCLAIMER: This note may have been dictated using voice recognition software and may contain grammatical errors.     NOTE: Consult report sent to referring provider via [employer] EMR.

## 2023-10-19 DIAGNOSIS — M17.0 PRIMARY OSTEOARTHRITIS OF BOTH KNEES: ICD-10-CM

## 2023-10-19 RX ORDER — DICLOFENAC SODIUM 50 MG/1
50 TABLET, DELAYED RELEASE ORAL 2 TIMES DAILY PRN
Qty: 60 TABLET | Refills: 0 | Status: SHIPPED | OUTPATIENT
Start: 2023-10-19 | End: 2023-11-18

## 2023-10-20 ENCOUNTER — OFFICE VISIT (OUTPATIENT)
Dept: ORTHOPEDICS | Facility: CLINIC | Age: 53
End: 2023-10-20
Payer: COMMERCIAL

## 2023-10-20 VITALS
DIASTOLIC BLOOD PRESSURE: 95 MMHG | HEART RATE: 66 BPM | WEIGHT: 225.81 LBS | SYSTOLIC BLOOD PRESSURE: 151 MMHG | HEIGHT: 70 IN | BODY MASS INDEX: 32.33 KG/M2

## 2023-10-20 DIAGNOSIS — M17.0 PRIMARY OSTEOARTHRITIS OF BOTH KNEES: Primary | ICD-10-CM

## 2023-10-20 PROCEDURE — 20610 LARGE JOINT ASPIRATION/INJECTION: BILATERAL KNEE: ICD-10-PCS | Mod: 50,,,

## 2023-10-20 PROCEDURE — 3077F PR MOST RECENT SYSTOLIC BLOOD PRESSURE >= 140 MM HG: ICD-10-PCS | Mod: CPTII,,,

## 2023-10-20 PROCEDURE — 4010F ACE/ARB THERAPY RXD/TAKEN: CPT | Mod: CPTII,,,

## 2023-10-20 PROCEDURE — 3080F DIAST BP >= 90 MM HG: CPT | Mod: CPTII,,,

## 2023-10-20 PROCEDURE — 3077F SYST BP >= 140 MM HG: CPT | Mod: CPTII,,,

## 2023-10-20 PROCEDURE — 1160F RVW MEDS BY RX/DR IN RCRD: CPT | Mod: CPTII,,,

## 2023-10-20 PROCEDURE — 99214 PR OFFICE/OUTPT VISIT, EST, LEVL IV, 30-39 MIN: ICD-10-PCS | Mod: 25,,,

## 2023-10-20 PROCEDURE — 3008F BODY MASS INDEX DOCD: CPT | Mod: CPTII,,,

## 2023-10-20 PROCEDURE — 1159F PR MEDICATION LIST DOCUMENTED IN MEDICAL RECORD: ICD-10-PCS | Mod: CPTII,,,

## 2023-10-20 PROCEDURE — 20610 DRAIN/INJ JOINT/BURSA W/O US: CPT | Mod: 50,,,

## 2023-10-20 PROCEDURE — 3008F PR BODY MASS INDEX (BMI) DOCUMENTED: ICD-10-PCS | Mod: CPTII,,,

## 2023-10-20 PROCEDURE — 4010F PR ACE/ARB THEARPY RXD/TAKEN: ICD-10-PCS | Mod: CPTII,,,

## 2023-10-20 PROCEDURE — 3080F PR MOST RECENT DIASTOLIC BLOOD PRESSURE >= 90 MM HG: ICD-10-PCS | Mod: CPTII,,,

## 2023-10-20 PROCEDURE — 99214 OFFICE O/P EST MOD 30 MIN: CPT | Mod: 25,,,

## 2023-10-20 PROCEDURE — 1159F MED LIST DOCD IN RCRD: CPT | Mod: CPTII,,,

## 2023-10-20 PROCEDURE — 1160F PR REVIEW ALL MEDS BY PRESCRIBER/CLIN PHARMACIST DOCUMENTED: ICD-10-PCS | Mod: CPTII,,,

## 2023-10-20 RX ORDER — LIDOCAINE HYDROCHLORIDE 20 MG/ML
3 INJECTION, SOLUTION INFILTRATION; PERINEURAL
Status: DISCONTINUED | OUTPATIENT
Start: 2023-10-20 | End: 2023-10-20 | Stop reason: HOSPADM

## 2023-10-20 RX ADMIN — LIDOCAINE HYDROCHLORIDE 3 ML: 20 INJECTION, SOLUTION INFILTRATION; PERINEURAL at 09:10

## 2023-10-20 NOTE — LETTER
October 20, 2023       [facility]  [address], [address]  [facility] [address]  Phone: [phone]  Fax: [phone]       Patient: Hang Mix   YOB: 1970  Date of Visit: 10/20/2023    To Whom It May Concern:    Tania Mix  was at [facility] on 10/20/2023. The patient may return to work on 10/21/2023 . If you have any questions or concerns, or if I can be of further assistance, please do not hesitate to contact me.    Sincerely,    RIOS Cisneros

## 2023-10-20 NOTE — PROCEDURES
Large Joint Aspiration/Injection: bilateral knee    Date/Time: 10/20/2023 9:15 AM    Performed by: Gwendolyn Hansen FNP  Authorized by: Gwendolyn Hansen FNP    Consent Done?:  Yes (Verbal)  Indications:  Pain  Site marked: the procedure site was marked    Timeout: prior to procedure the correct patient, procedure, and site was verified    Prep: patient was prepped and draped in usual sterile fashion      Local anesthesia used?: Yes    Local anesthetic:  Topical anesthetic and lidocaine 2% without epinephrine  Ultrasonic Guidance for needle placement?: No    Approach:  Superior (superolateral)  Location:  Knee  Laterality:  Bilateral  Site:  Bilateral knee  Medications (Right):  3 mL LIDOcaine HCL 20 mg/ml (2%) 20 mg/mL (2 %); 48 mg hylan g-f 20 48 mg/6 mL  Medications (Left):  3 mL LIDOcaine HCL 20 mg/ml (2%) 20 mg/mL (2 %); 48 mg hylan g-f 20 48 mg/6 mL  Patient tolerance:  Patient tolerated the procedure well with no immediate complications

## 2023-10-20 NOTE — PROGRESS NOTES
[facility]  Orthopedic Clinic Note      Chief Complaint:   Chief Complaint   Patient presents with    Appointment     Here today for bilateral knee Synvisc injections. He works as a  and has knee pain daily. This will be his first synvisc injections.      Referring Physician: Thierry Mortensen MD      History of Present Illness:    This is a 53 y.o. year old male presenting with complaints of right knee pain worsening for the last few years.  This knee pain is moderate to severe.  Patient states pain is worse with ambulation and activity, especially squats and lunges.  Patient states pain is global.  He takes prescribed Celebrex and is quite active in a home exercise program with continued symptoms.  09/14/2023 this patient comes in today stating that the corticosteroid injections were not helpful.  He is interested in Synvisc injections.  10/20/2023 patient presents for bilateral knee viscosupplementation injections.  His symptoms are unchanged since his prior visit.      Past Medical History:   Diagnosis Date    Hypertension     Insomnia        Past Surgical History:   Procedure Laterality Date    COLONOSCOPY         Current Outpatient Medications   Medication Sig    amLODIPine (NORVASC) 5 MG tablet Take 5 mg by mouth.    anastrozole (ARIMIDEX) 1 mg Tab Take 1 mg by mouth twice a week.    diclofenac (VOLTAREN) 50 MG EC tablet Take 1 tablet (50 mg total) by mouth 2 (two) times daily as needed (Pain). Take with food    lisinopriL (PRINIVIL,ZESTRIL) 30 MG tablet Take 30 mg by mouth once daily.    testosterone cypionate (DEPOTESTOTERONE CYPIONATE) 200 mg/mL injection Inject 200 mg into the muscle every 7 days.    traZODone (DESYREL) 100 MG tablet Take 100 mg by mouth every evening.    zolpidem (AMBIEN) 10 mg Tab Take 5 mg by mouth nightly as needed.    chlorproMAZINE (THORAZINE) 25 MG tablet Take 1 tablet (25 mg total) by mouth 3 (three) times daily as needed (hiccups).     No current  "facility-administered medications for this visit.       Review of patient's allergies indicates:  No Known Allergies    Family History   Problem Relation Age of Onset    Diabetes type II Father     Diabetes Father     Arthritis Mother     Diabetes Brother        Social History     Socioeconomic History    Marital status:    Tobacco Use    Smoking status: Never     Passive exposure: Never    Smokeless tobacco: Never   Substance and Sexual Activity    Alcohol use: Never    Drug use: Never    Sexual activity: Yes     Partners: Female     Birth control/protection: None           Review of Systems:  All review of systems negative except for those stated in the HPI.    Examination:    Vital Signs:    Vitals:    10/20/23 0908 10/20/23 0911   BP: (!) 151/95    Pulse: 66    Weight: 102.4 kg (225 lb 12.8 oz)    Height: 5' 10" (1.778 m)    PainSc:    4         Body mass index is 32.4 kg/m².    Physical Examination:  General: Well-developed, well-nourished.  Neuro: Alert and oriented x 3.  Psych: Normal mood and affect.  Card: Regular rate and rhythm  Resp: Respirations regular and unlabored  Right Knee Exam:  No obvious deformity. Range of motion from 3-120 degrees. Negative patella grind and equal subluxation of knee cap medial and lateral < 1cm. Negative patella tendon tenderness. Negative Lachman and anterior drawer test. Negative posterior drawer test. Negative varus and valgus stress test.  Positive medial joint line tenderness.  Positive Denise's medial sided knee pain.  Positive deep knee flexion been test with medial sided knee pain.  Negative lateral joint line tenderness. 4/5 strength and normal skin appearance. Sensibility normal.      Imaging:  Prior four views of the right knee demonstrate moderate degenerative changes with joint space narrowing, particularly in the medial aspect of the tibiofemoral compartment.      Assessment: Primary osteoarthritis of both knees  -     Large Joint Aspiration/Injection: " bilateral knee        Plan:  Plan to proceed with bilateral knee viscosupplementation injections today.  Continue previously prescribed diclofenac as needed for pain.  Avoid all other over-the-counter anti-inflammatories while taking this medication.  Encouraged ice application, rest, elevation as needed for swelling.  He will return to clinic in approximately 4 months for re-evaluation.  He and his spouse verbalized understanding of the plan of care with no further questions.    Large Joint Aspiration/Injection: bilateral knee    Date/Time: 10/20/2023 9:15 AM    Performed by: Gwendolyn Hansen FNP  Authorized by: Gwendolyn Hansen FNP    Consent Done?:  Yes (Verbal)  Indications:  Pain  Site marked: the procedure site was marked    Timeout: prior to procedure the correct patient, procedure, and site was verified    Prep: patient was prepped and draped in usual sterile fashion      Local anesthesia used?: Yes    Local anesthetic:  Topical anesthetic and lidocaine 2% without epinephrine  Ultrasonic Guidance for needle placement?: No    Approach:  Superior (superolateral)  Location:  Knee  Laterality:  Bilateral  Site:  Bilateral knee  Medications (Right):  3 mL LIDOcaine HCL 20 mg/ml (2%) 20 mg/mL (2 %); 48 mg hylan g-f 20 48 mg/6 mL  Medications (Left):  3 mL LIDOcaine HCL 20 mg/ml (2%) 20 mg/mL (2 %); 48 mg hylan g-f 20 48 mg/6 mL  Patient tolerance:  Patient tolerated the procedure well with no immediate complications      Follow up in about 4 months (around 2/20/2024) for Reevaluation.      DISCLAIMER: This note may have been dictated using voice recognition software and may contain grammatical errors.     NOTE: Consult report sent to referring provider via [employer].

## 2023-12-02 ENCOUNTER — PATIENT MESSAGE (OUTPATIENT)
Dept: ORTHOPEDICS | Facility: CLINIC | Age: 53
End: 2023-12-02
Payer: COMMERCIAL

## 2023-12-08 ENCOUNTER — OFFICE VISIT (OUTPATIENT)
Dept: ORTHOPEDICS | Facility: CLINIC | Age: 53
End: 2023-12-08
Payer: COMMERCIAL

## 2023-12-08 VITALS
HEART RATE: 68 BPM | HEIGHT: 70 IN | DIASTOLIC BLOOD PRESSURE: 91 MMHG | WEIGHT: 231 LBS | BODY MASS INDEX: 33.07 KG/M2 | SYSTOLIC BLOOD PRESSURE: 152 MMHG

## 2023-12-08 DIAGNOSIS — M17.0 PRIMARY OSTEOARTHRITIS OF BOTH KNEES: Primary | ICD-10-CM

## 2023-12-08 PROCEDURE — 1159F MED LIST DOCD IN RCRD: CPT | Mod: CPTII,,,

## 2023-12-08 PROCEDURE — 20610 DRAIN/INJ JOINT/BURSA W/O US: CPT | Mod: 50,,,

## 2023-12-08 PROCEDURE — 99214 OFFICE O/P EST MOD 30 MIN: CPT | Mod: 25,,,

## 2023-12-08 PROCEDURE — 99214 PR OFFICE/OUTPT VISIT, EST, LEVL IV, 30-39 MIN: ICD-10-PCS | Mod: 25,,,

## 2023-12-08 PROCEDURE — 3077F SYST BP >= 140 MM HG: CPT | Mod: CPTII,,,

## 2023-12-08 PROCEDURE — 4010F PR ACE/ARB THEARPY RXD/TAKEN: ICD-10-PCS | Mod: CPTII,,,

## 2023-12-08 PROCEDURE — 3080F DIAST BP >= 90 MM HG: CPT | Mod: CPTII,,,

## 2023-12-08 PROCEDURE — 3077F PR MOST RECENT SYSTOLIC BLOOD PRESSURE >= 140 MM HG: ICD-10-PCS | Mod: CPTII,,,

## 2023-12-08 PROCEDURE — 3008F BODY MASS INDEX DOCD: CPT | Mod: CPTII,,,

## 2023-12-08 PROCEDURE — 1159F PR MEDICATION LIST DOCUMENTED IN MEDICAL RECORD: ICD-10-PCS | Mod: CPTII,,,

## 2023-12-08 PROCEDURE — 4010F ACE/ARB THERAPY RXD/TAKEN: CPT | Mod: CPTII,,,

## 2023-12-08 PROCEDURE — 20610 LARGE JOINT ASPIRATION/INJECTION: BILATERAL KNEE: ICD-10-PCS | Mod: 50,,,

## 2023-12-08 PROCEDURE — 3080F PR MOST RECENT DIASTOLIC BLOOD PRESSURE >= 90 MM HG: ICD-10-PCS | Mod: CPTII,,,

## 2023-12-08 PROCEDURE — 3008F PR BODY MASS INDEX (BMI) DOCUMENTED: ICD-10-PCS | Mod: CPTII,,,

## 2023-12-08 RX ORDER — METHYLPREDNISOLONE ACETATE 80 MG/ML
40 INJECTION, SUSPENSION INTRA-ARTICULAR; INTRALESIONAL; INTRAMUSCULAR; SOFT TISSUE
Status: DISCONTINUED | OUTPATIENT
Start: 2023-12-08 | End: 2023-12-08 | Stop reason: HOSPADM

## 2023-12-08 RX ORDER — LIDOCAINE HYDROCHLORIDE 20 MG/ML
2 INJECTION, SOLUTION INFILTRATION; PERINEURAL
Status: DISCONTINUED | OUTPATIENT
Start: 2023-12-08 | End: 2023-12-08 | Stop reason: HOSPADM

## 2023-12-08 RX ORDER — CELECOXIB 200 MG/1
200 CAPSULE ORAL DAILY PRN
COMMUNITY
Start: 2023-11-21

## 2023-12-08 RX ADMIN — METHYLPREDNISOLONE ACETATE 40 MG: 80 INJECTION, SUSPENSION INTRA-ARTICULAR; INTRALESIONAL; INTRAMUSCULAR; SOFT TISSUE at 08:12

## 2023-12-08 RX ADMIN — LIDOCAINE HYDROCHLORIDE 2 ML: 20 INJECTION, SOLUTION INFILTRATION; PERINEURAL at 08:12

## 2023-12-08 NOTE — PROCEDURES
Large Joint Aspiration/Injection: bilateral knee    Date/Time: 12/8/2023 8:30 AM    Performed by: Gwendolyn Hansen FNP  Authorized by: Gwendolyn Hansen FNP    Consent Done?:  Yes (Verbal)  Indications:  Pain  Prep: patient was prepped and draped in usual sterile fashion    Approach:  Anterolateral  Location:  Knee  Laterality:  Bilateral  Site:  Bilateral knee  Medications (Right):  2 mL LIDOcaine HCL 20 mg/ml (2%) 20 mg/mL (2 %); 40 mg methylPREDNISolone acetate 80 mg/mL  Medications (Left):  2 mL LIDOcaine HCL 20 mg/ml (2%) 20 mg/mL (2 %); 40 mg methylPREDNISolone acetate 80 mg/mL  Patient tolerance:  Patient tolerated the procedure well with no immediate complications

## 2023-12-08 NOTE — PROGRESS NOTES
[facility]  Orthopedic Clinic Note      Chief Complaint:   Chief Complaint   Patient presents with    Appointment     Patient here for bilateral knee pain and would like steroid injections in both knees. He had synvisc injections in both knees on 10/20/23.      Referring Physician: No ref. provider found      History of Present Illness:    This is a 53 y.o. year old male presenting with complaints of right knee pain worsening for the last few years.  This knee pain is moderate to severe.  Patient states pain is worse with ambulation and activity, especially squats and lunges.  Patient states pain is global.  He takes prescribed Celebrex and is quite active in a home exercise program with continued symptoms.  09/14/2023 this patient comes in today stating that the corticosteroid injections were not helpful.  He is interested in Synvisc injections.  10/20/2023 patient presents for bilateral knee viscosupplementation injections.  His symptoms are unchanged since his prior visit.  12/08/2023 patient presents for re-evaluation of bilateral knee pain.  He received bilateral knee viscosupplementation injections at his prior visit.  He states that these injections were helpful for approximately 4 weeks.  He would like to discuss repeat corticosteroid injections today.      Past Medical History:   Diagnosis Date    Hypertension     Insomnia        Past Surgical History:   Procedure Laterality Date    COLONOSCOPY         Current Outpatient Medications   Medication Sig    celecoxib (CELEBREX) 200 MG capsule Take 200 mg by mouth daily as needed.    amLODIPine (NORVASC) 5 MG tablet Take 5 mg by mouth.    anastrozole (ARIMIDEX) 1 mg Tab Take 1 mg by mouth twice a week.    chlorproMAZINE (THORAZINE) 25 MG tablet Take 1 tablet (25 mg total) by mouth 3 (three) times daily as needed (hiccups).    lisinopriL (PRINIVIL,ZESTRIL) 30 MG tablet Take 30 mg by mouth once daily.    testosterone cypionate (DEPOTESTOTERONE CYPIONATE)  "200 mg/mL injection Inject 200 mg into the muscle every 7 days.    traZODone (DESYREL) 100 MG tablet Take 100 mg by mouth every evening.    zolpidem (AMBIEN) 10 mg Tab Take 5 mg by mouth nightly as needed.     No current facility-administered medications for this visit.       Review of patient's allergies indicates:  No Known Allergies    Family History   Problem Relation Age of Onset    Diabetes type II Father     Diabetes Father     Arthritis Mother     Diabetes Brother        Social History     Socioeconomic History    Marital status:    Tobacco Use    Smoking status: Never     Passive exposure: Never    Smokeless tobacco: Never   Substance and Sexual Activity    Alcohol use: Never    Drug use: Never    Sexual activity: Yes     Partners: Female     Birth control/protection: None           Review of Systems:  All review of systems negative except for those stated in the HPI.    Examination:    Vital Signs:    Vitals:    12/08/23 0828 12/08/23 0832   BP: (!) 142/90 (!) 152/91   Pulse: 68 68   Weight: 104.8 kg (231 lb)    Height: 5' 10" (1.778 m)          Body mass index is 33.15 kg/m².    Physical Examination:  General: Well-developed, well-nourished.  Neuro: Alert and oriented x 3.  Psych: Normal mood and affect.  Card: Regular rate and rhythm  Resp: Respirations regular and unlabored  Right Knee Exam:  No obvious deformity. Range of motion from 3-120 degrees. Negative patella grind and equal subluxation of knee cap medial and lateral < 1cm. Negative patella tendon tenderness. Negative Lachman and anterior drawer test. Negative posterior drawer test. Negative varus and valgus stress test.  Positive medial joint line tenderness.  Positive Denise's medial sided knee pain.  Positive deep knee flexion been test with medial sided knee pain.  Negative lateral joint line tenderness. 4/5 strength and normal skin appearance. Sensibility normal.      Imaging:  Prior four views of the right knee demonstrate moderate " degenerative changes with joint space narrowing, particularly in the medial aspect of the tibiofemoral compartment.      Assessment: Primary osteoarthritis of both knees  -     Large Joint Aspiration/Injection: bilateral knee        Plan:  Plan to proceed with bilateral knee corticosteroid injections today.  Continue previously prescribed diclofenac as needed for pain.  Avoid all other over-the-counter anti-inflammatories while taking this medication.  Encouraged ice application, rest, elevation as needed for swelling.  He will return to clinic in approximately 3 months for re-evaluation.  He verbalized understanding of the plan of care with no further questions.    Large Joint Aspiration/Injection: bilateral knee    Date/Time: 12/8/2023 8:30 AM    Performed by: Gwendolyn Hansen FNP  Authorized by: Gwendolyn Hansen FNP    Consent Done?:  Yes (Verbal)  Indications:  Pain  Prep: patient was prepped and draped in usual sterile fashion    Approach:  Anterolateral  Location:  Knee  Laterality:  Bilateral  Site:  Bilateral knee  Medications (Right):  2 mL LIDOcaine HCL 20 mg/ml (2%) 20 mg/mL (2 %); 40 mg methylPREDNISolone acetate 80 mg/mL  Medications (Left):  2 mL LIDOcaine HCL 20 mg/ml (2%) 20 mg/mL (2 %); 40 mg methylPREDNISolone acetate 80 mg/mL  Patient tolerance:  Patient tolerated the procedure well with no immediate complications      Follow up in about 3 months (around 3/8/2024) for Reevaluation.      DISCLAIMER: This note may have been dictated using voice recognition software and may contain grammatical errors.     NOTE: Consult report sent to referring provider via [employer].

## 2024-03-07 ENCOUNTER — OFFICE VISIT (OUTPATIENT)
Dept: ORTHOPEDICS | Facility: CLINIC | Age: 54
End: 2024-03-07
Payer: COMMERCIAL

## 2024-03-07 VITALS
WEIGHT: 229 LBS | HEIGHT: 70 IN | SYSTOLIC BLOOD PRESSURE: 154 MMHG | DIASTOLIC BLOOD PRESSURE: 95 MMHG | BODY MASS INDEX: 32.78 KG/M2 | HEART RATE: 68 BPM

## 2024-03-07 DIAGNOSIS — M17.0 PRIMARY OSTEOARTHRITIS OF BOTH KNEES: Primary | ICD-10-CM

## 2024-03-07 PROCEDURE — 3077F SYST BP >= 140 MM HG: CPT | Mod: CPTII,,, | Performed by: ORTHOPAEDIC SURGERY

## 2024-03-07 PROCEDURE — 3080F DIAST BP >= 90 MM HG: CPT | Mod: CPTII,,, | Performed by: ORTHOPAEDIC SURGERY

## 2024-03-07 PROCEDURE — 1160F RVW MEDS BY RX/DR IN RCRD: CPT | Mod: CPTII,,, | Performed by: ORTHOPAEDIC SURGERY

## 2024-03-07 PROCEDURE — 99214 OFFICE O/P EST MOD 30 MIN: CPT | Mod: 25,,, | Performed by: ORTHOPAEDIC SURGERY

## 2024-03-07 PROCEDURE — 20610 DRAIN/INJ JOINT/BURSA W/O US: CPT | Mod: 50,,, | Performed by: ORTHOPAEDIC SURGERY

## 2024-03-07 PROCEDURE — 4010F ACE/ARB THERAPY RXD/TAKEN: CPT | Mod: CPTII,,, | Performed by: ORTHOPAEDIC SURGERY

## 2024-03-07 PROCEDURE — 1159F MED LIST DOCD IN RCRD: CPT | Mod: CPTII,,, | Performed by: ORTHOPAEDIC SURGERY

## 2024-03-07 PROCEDURE — 3008F BODY MASS INDEX DOCD: CPT | Mod: CPTII,,, | Performed by: ORTHOPAEDIC SURGERY

## 2024-03-07 RX ORDER — LIDOCAINE HYDROCHLORIDE 20 MG/ML
2 INJECTION, SOLUTION INFILTRATION; PERINEURAL
Status: DISCONTINUED | OUTPATIENT
Start: 2024-03-07 | End: 2024-03-07 | Stop reason: HOSPADM

## 2024-03-07 RX ORDER — METHYLPREDNISOLONE ACETATE 80 MG/ML
40 INJECTION, SUSPENSION INTRA-ARTICULAR; INTRALESIONAL; INTRAMUSCULAR; SOFT TISSUE
Status: DISCONTINUED | OUTPATIENT
Start: 2024-03-07 | End: 2024-03-07 | Stop reason: HOSPADM

## 2024-03-07 RX ADMIN — METHYLPREDNISOLONE ACETATE 40 MG: 80 INJECTION, SUSPENSION INTRA-ARTICULAR; INTRALESIONAL; INTRAMUSCULAR; SOFT TISSUE at 08:03

## 2024-03-07 RX ADMIN — LIDOCAINE HYDROCHLORIDE 2 ML: 20 INJECTION, SOLUTION INFILTRATION; PERINEURAL at 08:03

## 2024-03-07 NOTE — PROCEDURES
Large Joint Aspiration/Injection: bilateral knee    Date/Time: 3/7/2024 8:15 AM    Performed by: Thierry Mortensen MD  Authorized by: Thierry Mortensen MD    Consent Done?:  Yes (Verbal)  Indications:  Arthritis  Site marked: the procedure site was marked    Timeout: prior to procedure the correct patient, procedure, and site was verified    Prep: patient was prepped and draped in usual sterile fashion    Approach:  Anterolateral  Location:  Knee  Laterality:  Bilateral  Site:  Bilateral knee  Medications (Right):  2 mL LIDOcaine HCL 20 mg/ml (2%) 20 mg/mL (2 %); 40 mg methylPREDNISolone acetate 80 mg/mL  Medications (Left):  2 mL LIDOcaine HCL 20 mg/ml (2%) 20 mg/mL (2 %); 40 mg methylPREDNISolone acetate 80 mg/mL  Patient tolerance:  Patient tolerated the procedure well with no immediate complications

## 2024-03-07 NOTE — PROGRESS NOTES
[facility]  Orthopedic Clinic Note      Chief Complaint:   Chief Complaint   Patient presents with    Follow-up     F/u bilateral knee synvisc injections 10/20/23. He states the synvisc helped for about a week and is requesting authorization for another.      Referring Physician: No ref. provider found      History of Present Illness:    This is a 53 y.o. year old male presenting with complaints of right knee pain worsening for the last few years.  This knee pain is moderate to severe.  Patient states pain is worse with ambulation and activity, especially squats and lunges.  Patient states pain is global.  He takes prescribed Celebrex and is quite active in a home exercise program with continued symptoms.  09/14/2023 this patient comes in today stating that the corticosteroid injections were not helpful.  He is interested in Synvisc injections.  10/20/2023 patient presents for bilateral knee viscosupplementation injections.  His symptoms are unchanged since his prior visit.  12/08/2023 patient presents for re-evaluation of bilateral knee pain.  He received bilateral knee viscosupplementation injections at his prior visit.  He states that these injections were helpful for approximately 4 weeks.  He would like to discuss repeat corticosteroid injections today.  03/07/2024 patient presents for follow-up on bilateral knee pain.  He received bilateral knee viscosupplementation injections in October and corticosteroid injections at his prior visit.  He reports that his knee pain is improving, but he remains symptomatic especially with lateral movements.  Taking previously prescribed diclofenac.  Utilizing over-the-counter Voltaren gel with some relief.      Past Medical History:   Diagnosis Date    Hypertension     Insomnia        Past Surgical History:   Procedure Laterality Date    COLONOSCOPY         Current Outpatient Medications   Medication Sig    amLODIPine (NORVASC) 5 MG tablet Take 5 mg by mouth.     "anastrozole (ARIMIDEX) 1 mg Tab Take 1 mg by mouth twice a week.    celecoxib (CELEBREX) 200 MG capsule Take 200 mg by mouth daily as needed.    lisinopriL (PRINIVIL,ZESTRIL) 30 MG tablet Take 30 mg by mouth once daily.    testosterone cypionate (DEPOTESTOTERONE CYPIONATE) 200 mg/mL injection Inject 200 mg into the muscle every 7 days.    traZODone (DESYREL) 100 MG tablet Take 100 mg by mouth every evening.    zolpidem (AMBIEN) 10 mg Tab Take 5 mg by mouth nightly as needed.    chlorproMAZINE (THORAZINE) 25 MG tablet Take 1 tablet (25 mg total) by mouth 3 (three) times daily as needed (hiccups).     No current facility-administered medications for this visit.       Review of patient's allergies indicates:  No Known Allergies    Family History   Problem Relation Age of Onset    Diabetes type II Father     Diabetes Father     Arthritis Mother     Diabetes Brother        Social History     Socioeconomic History    Marital status:    Tobacco Use    Smoking status: Never     Passive exposure: Never    Smokeless tobacco: Never   Substance and Sexual Activity    Alcohol use: Never    Drug use: Never    Sexual activity: Yes     Partners: Female     Birth control/protection: None           Review of Systems:  All review of systems negative except for those stated in the HPI.    Examination:    Vital Signs:    Vitals:    03/07/24 0829 03/07/24 0842   BP: (!) 159/104 (!) 154/95   Pulse: 67 68   Weight: 103.9 kg (229 lb) 103.9 kg (229 lb)   Height: 5' 10" (1.778 m) 5' 10" (1.778 m)         Body mass index is 32.86 kg/m².    Physical Examination:  General: Well-developed, well-nourished.  Neuro: Alert and oriented x 3.  Psych: Normal mood and affect.  Card: Regular rate and rhythm  Resp: Respirations regular and unlabored  Right Knee Exam:  No obvious deformity. Range of motion from 3-120 degrees. Negative patella grind and equal subluxation of knee cap medial and lateral < 1cm. Negative patella tendon tenderness. " Negative Lachman and anterior drawer test. Negative posterior drawer test. Negative varus and valgus stress test.  Positive medial joint line tenderness.  Positive Denise's medial sided knee pain.  Positive deep knee flexion been test with medial sided knee pain.  Negative lateral joint line tenderness. 4/5 strength and normal skin appearance. Sensibility normal.      Imaging:  Prior four views of the right knee demonstrate moderate degenerative changes with joint space narrowing, particularly in the medial aspect of the tibiofemoral compartment.      Assessment: Primary osteoarthritis of both knees  -     Prior authorization Order    Other orders  -     Large Joint Aspiration/Injection: bilateral knee        Plan:  Plan to proceed with bilateral knee corticosteroid injections today.  Continue previously prescribed diclofenac as needed for pain.  Prescription routed for topical analgesic from [facility] pharmacy.  Avoid all other over-the-counter anti-inflammatories while taking this medication.  Encouraged ice application, rest, elevation as needed for swelling.  He will return to clinic for repeat bilateral knee viscosupplementation injections once authorization is obtained.  He verbalized understanding of the plan of care with no further questions.    Thierry Mortensen MD personally performed the services described in this documentation, including but not limited to patient's history, physical examination, and assessment and plan of care. All medical record entries made by YULISSA Cisneros were performed at his direction and in his presence. The medical record was reviewed and is accurate and complete.    Large Joint Aspiration/Injection: bilateral knee    Date/Time: 3/7/2024 8:15 AM    Performed by: Thierry Mortensen MD  Authorized by: Thierry Mortensen MD    Consent Done?:  Yes (Verbal)  Indications:  Arthritis  Site marked: the procedure site was marked    Timeout: prior to procedure the correct patient,  procedure, and site was verified    Prep: patient was prepped and draped in usual sterile fashion    Approach:  Anterolateral  Location:  Knee  Laterality:  Bilateral  Site:  Bilateral knee  Medications (Right):  2 mL LIDOcaine HCL 20 mg/ml (2%) 20 mg/mL (2 %); 40 mg methylPREDNISolone acetate 80 mg/mL  Medications (Left):  2 mL LIDOcaine HCL 20 mg/ml (2%) 20 mg/mL (2 %); 40 mg methylPREDNISolone acetate 80 mg/mL  Patient tolerance:  Patient tolerated the procedure well with no immediate complications      Follow up for Repeat bilateral knee viscosupplementation injections.      DISCLAIMER: This note may have been dictated using voice recognition software and may contain grammatical errors.     NOTE: Consult report sent to referring provider via [employer] EMR.

## 2024-04-18 ENCOUNTER — OFFICE VISIT (OUTPATIENT)
Dept: URGENT CARE | Facility: CLINIC | Age: 54
End: 2024-04-18
Payer: COMMERCIAL

## 2024-04-18 VITALS
BODY MASS INDEX: 32.78 KG/M2 | HEART RATE: 103 BPM | TEMPERATURE: 100 F | WEIGHT: 229 LBS | SYSTOLIC BLOOD PRESSURE: 119 MMHG | HEIGHT: 70 IN | OXYGEN SATURATION: 96 % | DIASTOLIC BLOOD PRESSURE: 72 MMHG | RESPIRATION RATE: 20 BRPM

## 2024-04-18 DIAGNOSIS — R11.10 VOMITING AND DIARRHEA: Primary | ICD-10-CM

## 2024-04-18 DIAGNOSIS — R11.10 VOMITING, UNSPECIFIED VOMITING TYPE, UNSPECIFIED WHETHER NAUSEA PRESENT: ICD-10-CM

## 2024-04-18 DIAGNOSIS — R19.7 VOMITING AND DIARRHEA: Primary | ICD-10-CM

## 2024-04-18 PROCEDURE — 99213 OFFICE O/P EST LOW 20 MIN: CPT | Mod: 25,,, | Performed by: NURSE PRACTITIONER

## 2024-04-18 PROCEDURE — 96372 THER/PROPH/DIAG INJ SC/IM: CPT | Mod: ,,, | Performed by: NURSE PRACTITIONER

## 2024-04-18 RX ORDER — LISINOPRIL 40 MG/1
40 TABLET ORAL
COMMUNITY
Start: 2024-03-20

## 2024-04-18 RX ORDER — PROMETHAZINE HYDROCHLORIDE 25 MG/1
25 TABLET ORAL EVERY 4 HOURS PRN
Qty: 20 TABLET | Refills: 0 | Status: SHIPPED | OUTPATIENT
Start: 2024-04-18 | End: 2024-04-23

## 2024-04-18 RX ORDER — PROMETHAZINE HYDROCHLORIDE 25 MG/ML
25 INJECTION, SOLUTION INTRAMUSCULAR; INTRAVENOUS
Status: COMPLETED | OUTPATIENT
Start: 2024-04-18 | End: 2024-04-18

## 2024-04-18 RX ADMIN — PROMETHAZINE HYDROCHLORIDE 25 MG: 25 INJECTION, SOLUTION INTRAMUSCULAR; INTRAVENOUS at 06:04

## 2024-04-18 NOTE — LETTER
April 18, 2024      [facility]  [address], [address]  [place] [address]  Phone: [phone]  Fax: [phone]       Patient: Hang Mix   YOB: 1970  Date of Visit: 04/18/2024    To Whom It May Concern:    Tania Mix  was at [facility] on 04/18/2024. Please excuse patient from work on 04/18/24 through 04/19/24. The patient may return to work with no restrictions. If you have any questions or concerns, or if I can be of further assistance, please do not hesitate to contact me.    Sincerely,    Maddy Sigala LPN

## 2024-04-18 NOTE — PATIENT INSTRUCTIONS
Take phenergan as needed for nausea.  Immodium OTC for diarrhea  Rest and stay hydrated.  Take tylenol/motrin as needed for pain/fever.  Eat a bland diet for the next few days while your stomach recovers.  Please follow up with your primary care provider within 2-5 days if your signs and symptoms have not resolved or worsen.   If your condition worsens or fails to improve we recommend that you receive another evaluation at the emergency room immediately or contact your primary medical clinic to discuss your concerns.

## 2024-04-18 NOTE — PROGRESS NOTES
"Subjective:      Patient ID: Hang Mix is a 53 y.o. male.    Vitals:  height is 5' 10" (1.778 m) and weight is 103.9 kg (229 lb). His temperature is 100.1 °F (37.8 °C). His blood pressure is 119/72 and his pulse is 103. His respiration is 20 and oxygen saturation is 96%.     Chief Complaint: Nausea (Throwing up and body aches - Entered by patient)     Patient is a 53 y.o. male who presents to urgent care with complaints of vomiting, diarrhea, without abdominal pain.  Onset last night, possibly from eating fish at a restaurant. Alleviating factors include Zofran with moderate amount of relief.      Gastrointestinal:  Positive for nausea, vomiting and diarrhea. Negative for abdominal pain and constipation.      Objective:     Physical Exam   Constitutional: He is oriented to person, place, and time. He appears well-developed.   HENT:   Head: Normocephalic and atraumatic.   Ears:   Right Ear: External ear normal.   Left Ear: External ear normal.   Nose: Nose normal.   Mouth/Throat: Mucous membranes are normal.   Eyes: Conjunctivae and lids are normal.   Neck: Trachea normal. Neck supple.   Cardiovascular: Normal rate, regular rhythm and normal heart sounds.   Pulmonary/Chest: Effort normal and breath sounds normal. No respiratory distress.   Abdominal: Normal appearance and bowel sounds are normal. He exhibits no distension and no mass. Soft. There is no abdominal tenderness. There is no rebound and no guarding.   Musculoskeletal: Normal range of motion.         General: Normal range of motion.   Neurological: He is alert and oriented to person, place, and time. He has normal strength.   Skin: Skin is warm, dry, intact, not diaphoretic and not pale.   Psychiatric: His speech is normal and behavior is normal. Judgment and thought content normal.   Nursing note and vitals reviewed.      Assessment:     1. Vomiting and diarrhea    2. Vomiting, unspecified vomiting type, unspecified whether nausea present  "       Plan:   Take phenergan as needed for nausea.  Immodium OTC for diarrhea  Rest and stay hydrated.  Take tylenol/motrin as needed for pain/fever.  Eat a bland diet for the next few days while your stomach recovers.  Please follow up with your primary care provider within 2-5 days if your signs and symptoms have not resolved or worsen.   If your condition worsens or fails to improve we recommend that you receive another evaluation at the emergency room immediately or contact your primary medical clinic to discuss your concerns.      Vomiting and diarrhea    Vomiting, unspecified vomiting type, unspecified whether nausea present  -     promethazine injection 25 mg  -     promethazine (PHENERGAN) 25 MG tablet; Take 1 tablet (25 mg total) by mouth every 4 (four) hours as needed for Nausea (nausea and vomiting).  Dispense: 20 tablet; Refill: 0

## 2024-04-18 NOTE — LETTER
April 18,2024      [facility] at [facility]  [address], [address]  [place] [address]  Phone: [phone]  Fax: [phone]       Patient: Hang Mix   YOB: 1970  Date of Visit: 04/18/2024  To Whom It May Concern:    Tania Mix  was at [facility] on 04/18/2024. The patient may return to work/school on 04/20/2024 with no restrictions. If you have any questions or concerns, or if I can be of further assistance, please do not hesitate to contact me.    Sincerely,    Blaze Gilbert MA

## 2024-04-18 NOTE — LETTER
April 18,2024      [facility] at [facility]  [address], [address]  [place] [address]  Phone: [phone]  Fax: [phone]       Patient: Hang Mix   YOB: 1970  Date of Visit: 04/18/2024    To Whom It May Concern:    Tania Mix  was at [facility] on 04/18/2024. The patient may return to work/school on 04/21/2024 with no restrictions. If you have any questions or concerns, or if I can be of further assistance, please do not hesitate to contact me.    Sincerely,    Blaze Gilbert MA

## 2024-04-26 ENCOUNTER — OFFICE VISIT (OUTPATIENT)
Dept: ORTHOPEDICS | Facility: CLINIC | Age: 54
End: 2024-04-26
Payer: COMMERCIAL

## 2024-04-26 VITALS
HEART RATE: 64 BPM | WEIGHT: 228.19 LBS | SYSTOLIC BLOOD PRESSURE: 148 MMHG | DIASTOLIC BLOOD PRESSURE: 91 MMHG | BODY MASS INDEX: 32.67 KG/M2 | HEIGHT: 70 IN

## 2024-04-26 DIAGNOSIS — M17.0 PRIMARY OSTEOARTHRITIS OF BOTH KNEES: Primary | ICD-10-CM

## 2024-04-26 PROCEDURE — 4010F ACE/ARB THERAPY RXD/TAKEN: CPT | Mod: CPTII,,,

## 2024-04-26 PROCEDURE — 3008F BODY MASS INDEX DOCD: CPT | Mod: CPTII,,,

## 2024-04-26 PROCEDURE — 1159F MED LIST DOCD IN RCRD: CPT | Mod: CPTII,,,

## 2024-04-26 PROCEDURE — 1160F RVW MEDS BY RX/DR IN RCRD: CPT | Mod: CPTII,,,

## 2024-04-26 PROCEDURE — 99499 UNLISTED E&M SERVICE: CPT | Mod: ,,,

## 2024-04-26 PROCEDURE — 3077F SYST BP >= 140 MM HG: CPT | Mod: CPTII,,,

## 2024-04-26 PROCEDURE — 20610 DRAIN/INJ JOINT/BURSA W/O US: CPT | Mod: 50,,,

## 2024-04-26 PROCEDURE — 3080F DIAST BP >= 90 MM HG: CPT | Mod: CPTII,,,

## 2024-04-26 RX ORDER — LIDOCAINE HYDROCHLORIDE 20 MG/ML
3 INJECTION, SOLUTION INFILTRATION; PERINEURAL
Status: DISCONTINUED | OUTPATIENT
Start: 2024-04-26 | End: 2024-04-26 | Stop reason: HOSPADM

## 2024-04-26 RX ORDER — ALBUTEROL SULFATE 0.63 MG/3ML
0.63 SOLUTION RESPIRATORY (INHALATION) EVERY 6 HOURS PRN
COMMUNITY

## 2024-04-26 RX ADMIN — LIDOCAINE HYDROCHLORIDE 3 ML: 20 INJECTION, SOLUTION INFILTRATION; PERINEURAL at 08:04

## 2024-04-26 NOTE — PROGRESS NOTES
Orthopaedic Clinic  Orthopedic Clinic Note      Chief Complaint:   Chief Complaint   Patient presents with    Appointment     Patient here today for bilateral Synvisc injections.      Referring Physician: Thierry Mortensen MD      History of Present Illness:    This is a 53 y.o. year old male presenting with complaints of right knee pain worsening for the last few years.  This knee pain is moderate to severe.  Patient states pain is worse with ambulation and activity, especially squats and lunges.  Patient states pain is global.  He takes prescribed Celebrex and is quite active in a home exercise program with continued symptoms.  09/14/2023 this patient comes in today stating that the corticosteroid injections were not helpful.  He is interested in Synvisc injections.  10/20/2023 patient presents for bilateral knee viscosupplementation injections.  His symptoms are unchanged since his prior visit.  12/08/2023 patient presents for re-evaluation of bilateral knee pain.  He received bilateral knee viscosupplementation injections at his prior visit.  He states that these injections were helpful for approximately 4 weeks.  He would like to discuss repeat corticosteroid injections today.  03/07/2024 patient presents for follow-up on bilateral knee pain.  He received bilateral knee viscosupplementation injections in October and corticosteroid injections at his prior visit.  He reports that his knee pain is improving, but he remains symptomatic especially with lateral movements.  Taking previously prescribed diclofenac.  Utilizing over-the-counter Voltaren gel with some relief.  04/26/2024 Patient presents for bilateral knee viscosupplementation injections.  He received bilateral knee corticosteroid injections at his prior visit.  He states that these were somewhat helpful, but his symptoms have returned.      Past Medical History:   Diagnosis Date    Hypertension     Insomnia        Past Surgical History:   Procedure  "Laterality Date    COLONOSCOPY         Current Outpatient Medications   Medication Sig Dispense Refill    albuterol (ACCUNEB) 0.63 mg/3 mL Nebu Take 0.63 mg by nebulization every 6 (six) hours as needed (as needed shortness of breath). Rescue      amLODIPine (NORVASC) 5 MG tablet Take 5 mg by mouth.      anastrozole (ARIMIDEX) 1 mg Tab Take 1 mg by mouth twice a week.      celecoxib (CELEBREX) 200 MG capsule Take 200 mg by mouth daily as needed.      lisinopriL (PRINIVIL,ZESTRIL) 40 MG tablet Take 40 mg by mouth.      testosterone cypionate (DEPOTESTOTERONE CYPIONATE) 200 mg/mL injection Inject 200 mg into the muscle every 7 days.      traZODone (DESYREL) 100 MG tablet Take 100 mg by mouth every evening.      zolpidem (AMBIEN) 10 mg Tab Take 5 mg by mouth nightly as needed.      chlorproMAZINE (THORAZINE) 25 MG tablet Take 1 tablet (25 mg total) by mouth 3 (three) times daily as needed (hiccups). 9 tablet 0     No current facility-administered medications for this visit.       Review of patient's allergies indicates:  No Known Allergies    Family History   Problem Relation Name Age of Onset    Diabetes type II Father Connor Fordyolanda     Diabetes Father Connor SUMMERS Maria Del Rosario     Arthritis Mother Pamela Blairtiffany     Diabetes Brother Connor JUVENTINO Mix        Social History     Socioeconomic History    Marital status:    Tobacco Use    Smoking status: Never     Passive exposure: Never    Smokeless tobacco: Never   Substance and Sexual Activity    Alcohol use: Never    Drug use: Never    Sexual activity: Yes     Partners: Female     Birth control/protection: None           Review of Systems:  All review of systems negative except for those stated in the HPI.    Examination:    Vital Signs:    Vitals:    04/26/24 0844 04/26/24 0845   BP: (!) 155/95 (!) 148/91   Pulse: 95 64   Weight: 103.5 kg (228 lb 3.2 oz)    Height: 5' 10" (1.778 m)          Body mass index is 32.74 kg/m².    Physical Examination:  General: " Well-developed, well-nourished.  Neuro: Alert and oriented x 3.  Psych: Normal mood and affect.  Card: Regular rate and rhythm  Resp: Respirations regular and unlabored  Right Knee Exam:  No obvious deformity. Range of motion from 3-120 degrees. Negative patella grind and equal subluxation of knee cap medial and lateral < 1cm. Negative patella tendon tenderness. Negative Lachman and anterior drawer test. Negative posterior drawer test. Negative varus and valgus stress test.  Positive medial joint line tenderness.  Positive Denise's medial sided knee pain.  Positive deep knee flexion been test with medial sided knee pain.  Negative lateral joint line tenderness. 4/5 strength and normal skin appearance. Sensibility normal.      Imaging:  Prior four views of the right knee demonstrate moderate degenerative changes with joint space narrowing, particularly in the medial aspect of the tibiofemoral compartment.      Assessment: Primary osteoarthritis of both knees  -     Large Joint Aspiration/Injection: bilateral knee        Plan:  Plan to proceed with bilateral knee viscosupplementation injections today.  Continue previously prescribed diclofenac and topical medication as needed for pain.  Avoid all other over-the-counter anti-inflammatories while taking this medication.  Encouraged ice application, rest, elevation as needed for swelling.  He will return to clinic in approximately 3 months for re-evaluation.  He and his spouse verbalized understanding of the plan of care with no further questions.      Large Joint Aspiration/Injection: bilateral knee    Date/Time: 4/26/2024 8:30 AM    Performed by: Gwendolyn Hansen FNP  Authorized by: Gwendolyn Hansen FNP    Consent Done?:  Yes (Verbal)  Indications:  Pain  Site marked: the procedure site was marked    Timeout: prior to procedure the correct patient, procedure, and site was verified    Prep: patient was prepped and draped in usual sterile fashion      Local  anesthesia used?: Yes    Local anesthetic:  Topical anesthetic and lidocaine 2% without epinephrine  Ultrasonic Guidance for needle placement?: No    Approach:  Superior (superolateral)  Location:  Knee  Laterality:  Bilateral  Site:  Bilateral knee  Medications (Right):  48 mg hylan g-f 20 48 mg/6 mL; 3 mL LIDOcaine HCL 20 mg/ml (2%) 20 mg/mL (2 %)  Medications (Left):  48 mg hylan g-f 20 48 mg/6 mL; 3 mL LIDOcaine HCL 20 mg/ml (2%) 20 mg/mL (2 %)  Aspirate (Left) amount (mL):  5  Aspirate (Left):  Serous  Patient tolerance:  Patient tolerated the procedure well with no immediate complications      Follow up in about 3 months (around 7/26/2024) for Reevaluation.      DISCLAIMER: This note may have been dictated using voice recognition software and may contain grammatical errors.     NOTE: Consult report sent to referring provider via [employer] EMR.

## 2024-04-26 NOTE — PROCEDURES
Large Joint Aspiration/Injection: bilateral knee    Date/Time: 4/26/2024 8:30 AM    Performed by: Gwendolyn Hansen FNP  Authorized by: Gwendolyn Hansen FNP    Consent Done?:  Yes (Verbal)  Indications:  Pain  Site marked: the procedure site was marked    Timeout: prior to procedure the correct patient, procedure, and site was verified    Prep: patient was prepped and draped in usual sterile fashion      Local anesthesia used?: Yes    Local anesthetic:  Topical anesthetic and lidocaine 2% without epinephrine  Ultrasonic Guidance for needle placement?: No    Approach:  Superior (superolateral)  Location:  Knee  Laterality:  Bilateral  Site:  Bilateral knee  Medications (Right):  48 mg hylan g-f 20 48 mg/6 mL; 3 mL LIDOcaine HCL 20 mg/ml (2%) 20 mg/mL (2 %)  Medications (Left):  48 mg hylan g-f 20 48 mg/6 mL; 3 mL LIDOcaine HCL 20 mg/ml (2%) 20 mg/mL (2 %)  Aspirate (Left) amount (mL):  5  Aspirate (Left):  Serous  Patient tolerance:  Patient tolerated the procedure well with no immediate complications

## 2024-08-02 ENCOUNTER — OFFICE VISIT (OUTPATIENT)
Dept: ORTHOPEDICS | Facility: CLINIC | Age: 54
End: 2024-08-02
Payer: COMMERCIAL

## 2024-08-02 VITALS
SYSTOLIC BLOOD PRESSURE: 152 MMHG | DIASTOLIC BLOOD PRESSURE: 92 MMHG | BODY MASS INDEX: 31.57 KG/M2 | WEIGHT: 220.5 LBS | HEART RATE: 73 BPM | HEIGHT: 70 IN

## 2024-08-02 DIAGNOSIS — M17.0 PRIMARY OSTEOARTHRITIS OF BOTH KNEES: Primary | ICD-10-CM

## 2024-08-02 RX ORDER — BETAMETHASONE SODIUM PHOSPHATE AND BETAMETHASONE ACETATE 3; 3 MG/ML; MG/ML
6 INJECTION, SUSPENSION INTRA-ARTICULAR; INTRALESIONAL; INTRAMUSCULAR; SOFT TISSUE
Status: DISCONTINUED | OUTPATIENT
Start: 2024-08-02 | End: 2024-08-02 | Stop reason: HOSPADM

## 2024-08-02 RX ORDER — LIDOCAINE HYDROCHLORIDE 20 MG/ML
2 INJECTION, SOLUTION INFILTRATION; PERINEURAL
Status: DISCONTINUED | OUTPATIENT
Start: 2024-08-02 | End: 2024-08-02 | Stop reason: HOSPADM

## 2024-08-02 RX ADMIN — BETAMETHASONE SODIUM PHOSPHATE AND BETAMETHASONE ACETATE 6 MG: 3; 3 INJECTION, SUSPENSION INTRA-ARTICULAR; INTRALESIONAL; INTRAMUSCULAR; SOFT TISSUE at 08:08

## 2024-08-02 RX ADMIN — LIDOCAINE HYDROCHLORIDE 2 ML: 20 INJECTION, SOLUTION INFILTRATION; PERINEURAL at 08:08

## 2024-08-02 NOTE — PROCEDURES
Large Joint Aspiration/Injection: bilateral knee    Date/Time: 8/2/2024 8:45 AM    Performed by: Gwendolyn Hansen FNP  Authorized by: Gwendolyn Hansen FNP    Consent Done?:  Yes (Verbal)  Indications:  Arthritis and pain  Site marked: the procedure site was marked    Timeout: prior to procedure the correct patient, procedure, and site was verified    Prep: patient was prepped and draped in usual sterile fashion    Approach:  Anterolateral  Location:  Knee  Laterality:  Bilateral  Site:  Bilateral knee  Medications (Right):  2 mL LIDOcaine HCL 20 mg/ml (2%) 20 mg/mL (2 %); 6 mg betamethasone acetate-betamethasone sodium phosphate 6 mg/mL  Medications (Left):  2 mL LIDOcaine HCL 20 mg/ml (2%) 20 mg/mL (2 %); 6 mg betamethasone acetate-betamethasone sodium phosphate 6 mg/mL  Patient tolerance:  Patient tolerated the procedure well with no immediate complications

## 2024-08-02 NOTE — PROGRESS NOTES
Orthopaedic Clinic  Orthopedic Clinic Note      Chief Complaint:   Chief Complaint   Patient presents with    Appointment     Patient here today for 3m f/u bilateral synvisc injections on 04/26/24. He would like bilateral steroid inj today and repeat synvisc injections whenever they are due next. They visco injections took the knee pain from 8/10 to a 2/10.      Referring Physician: No ref. provider found      History of Present Illness:    This is a 53 y.o. year old male presenting with complaints of right knee pain worsening for the last few years.  This knee pain is moderate to severe.  Patient states pain is worse with ambulation and activity, especially squats and lunges.  Patient states pain is global.  He takes prescribed Celebrex and is quite active in a home exercise program with continued symptoms.  09/14/2023 this patient comes in today stating that the corticosteroid injections were not helpful.  He is interested in Synvisc injections.  10/20/2023 patient presents for bilateral knee viscosupplementation injections.  His symptoms are unchanged since his prior visit.  12/08/2023 patient presents for re-evaluation of bilateral knee pain.  He received bilateral knee viscosupplementation injections at his prior visit.  He states that these injections were helpful for approximately 4 weeks.  He would like to discuss repeat corticosteroid injections today.  03/07/2024 patient presents for follow-up on bilateral knee pain.  He received bilateral knee viscosupplementation injections in October and corticosteroid injections at his prior visit.  He reports that his knee pain is improving, but he remains symptomatic especially with lateral movements.  Taking previously prescribed diclofenac.  Utilizing over-the-counter Voltaren gel with some relief.  04/26/2024 Patient presents for bilateral knee viscosupplementation injections.  He received bilateral knee corticosteroid injections at his prior visit.  He  states that these were somewhat helpful, but his symptoms have returned.  08/02/2024 patient presents for follow-up on bilateral knee pain.  He received bilateral knee viscosupplementation injections at his prior visit.  He reports that they were quite helpful until about a week ago.  He is extremely active and continues to exercise regularly.  He is also a  which requires strenuous activity from him as well.      Past Medical History:   Diagnosis Date    Hypertension     Insomnia        Past Surgical History:   Procedure Laterality Date    COLONOSCOPY         Current Outpatient Medications   Medication Sig    albuterol (ACCUNEB) 0.63 mg/3 mL Nebu Take 0.63 mg by nebulization every 6 (six) hours as needed (as needed shortness of breath). Rescue    amLODIPine (NORVASC) 5 MG tablet Take 5 mg by mouth.    anastrozole (ARIMIDEX) 1 mg Tab Take 1 mg by mouth twice a week.    celecoxib (CELEBREX) 200 MG capsule Take 200 mg by mouth daily as needed.    lisinopriL (PRINIVIL,ZESTRIL) 40 MG tablet Take 40 mg by mouth.    semaglutide (OZEMPIC) 0.25 mg or 0.5 mg(2 mg/1.5 mL) pen injector     testosterone cypionate (DEPOTESTOTERONE CYPIONATE) 200 mg/mL injection Inject 200 mg into the muscle every 7 days.    traZODone (DESYREL) 100 MG tablet Take 100 mg by mouth every evening.    zolpidem (AMBIEN) 10 mg Tab Take 5 mg by mouth nightly as needed.    chlorproMAZINE (THORAZINE) 25 MG tablet Take 1 tablet (25 mg total) by mouth 3 (three) times daily as needed (hiccups).     No current facility-administered medications for this visit.       Review of patient's allergies indicates:  No Known Allergies    Family History   Problem Relation Name Age of Onset    Diabetes type II Father Connor SUMMERS Maria Del Rosario     Diabetes Father Connor Mix     Arthritis Mother Pamela REESE Maria Del Rosario     Diabetes Brother Connor JUVENTINO Mix        Social History     Socioeconomic History    Marital status:    Tobacco Use    Smoking status:  "Never     Passive exposure: Never    Smokeless tobacco: Never   Substance and Sexual Activity    Alcohol use: Never    Drug use: Never    Sexual activity: Yes     Partners: Female     Birth control/protection: None           Review of Systems:  All review of systems negative except for those stated in the HPI.    Examination:    Vital Signs:    Vitals:    08/02/24 0901 08/02/24 0907   BP: (!) 155/96 (!) 152/92   Pulse: 66 73   Weight: 100 kg (220 lb 8 oz)    Height: 5' 10" (1.778 m)          Body mass index is 31.64 kg/m².    Physical Examination:  General: Well-developed, well-nourished.  Neuro: Alert and oriented x 3.  Psych: Normal mood and affect.  Card: Regular rate and rhythm  Resp: Respirations regular and unlabored  Bilateral Knee Exam:  No obvious deformity. Range of motion from 0-120 degrees. Negative patella grind and equal subluxation of knee cap medial and lateral < 1cm. Negative patella tendon tenderness. Negative Lachman and anterior drawer test. Negative posterior drawer test. Negative varus and valgus stress test.  Positive medial joint line tenderness.  Positive Denise's medial sided knee pain.  Positive deep knee flexion been test with medial sided knee pain.  Negative lateral joint line tenderness. 4/5 strength and normal skin appearance. Sensibility normal.      Imaging:  Prior four views of the right knee demonstrate moderate degenerative changes with joint space narrowing, particularly in the medial aspect of the tibiofemoral compartment.      Assessment: Primary osteoarthritis of both knees  -     Prior authorization Order  -     Large Joint Aspiration/Injection: bilateral knee        Plan:  Plan to proceed with bilateral knee corticosteroid injections today.  Continue previously prescribed Celebrex and topical medication as needed for pain.  Avoid all other over-the-counter anti-inflammatories while taking this medication.  Encouraged ice application, rest, elevation as needed for " swelling.  He will return to clinic in approximately 3 months for re-evaluation with repeat bilateral knee viscosupplementation injections.  He and his spouse verbalized understanding of the plan of care with no further questions.      Large Joint Aspiration/Injection: bilateral knee    Date/Time: 8/2/2024 8:45 AM    Performed by: Gwendolyn Hansen FNP  Authorized by: Gwendolyn Hansen FNP    Consent Done?:  Yes (Verbal)  Indications:  Arthritis and pain  Site marked: the procedure site was marked    Timeout: prior to procedure the correct patient, procedure, and site was verified    Prep: patient was prepped and draped in usual sterile fashion    Approach:  Anterolateral  Location:  Knee  Laterality:  Bilateral  Site:  Bilateral knee  Medications (Right):  2 mL LIDOcaine HCL 20 mg/ml (2%) 20 mg/mL (2 %); 6 mg betamethasone acetate-betamethasone sodium phosphate 6 mg/mL  Medications (Left):  2 mL LIDOcaine HCL 20 mg/ml (2%) 20 mg/mL (2 %); 6 mg betamethasone acetate-betamethasone sodium phosphate 6 mg/mL  Patient tolerance:  Patient tolerated the procedure well with no immediate complications      Follow up in about 3 months (around 11/2/2024) for Repeat bilateral knee viscosupplementation injections.      DISCLAIMER: This note may have been dictated using voice recognition software and may contain grammatical errors.     NOTE: Consult report sent to referring provider via [employer] EMR.

## 2024-10-15 ENCOUNTER — PATIENT MESSAGE (OUTPATIENT)
Dept: ORTHOPEDICS | Facility: CLINIC | Age: 54
End: 2024-10-15
Payer: COMMERCIAL

## 2024-10-25 ENCOUNTER — PATIENT MESSAGE (OUTPATIENT)
Dept: RESEARCH | Facility: HOSPITAL | Age: 54
End: 2024-10-25
Payer: COMMERCIAL

## 2024-11-05 ENCOUNTER — OFFICE VISIT (OUTPATIENT)
Dept: ORTHOPEDICS | Facility: CLINIC | Age: 54
End: 2024-11-05
Payer: COMMERCIAL

## 2024-11-05 VITALS — HEIGHT: 70 IN | WEIGHT: 206 LBS | BODY MASS INDEX: 29.49 KG/M2

## 2024-11-05 DIAGNOSIS — M17.0 PRIMARY OSTEOARTHRITIS OF BOTH KNEES: Primary | ICD-10-CM

## 2024-11-05 PROCEDURE — 1159F MED LIST DOCD IN RCRD: CPT | Mod: CPTII,,,

## 2024-11-05 PROCEDURE — 3008F BODY MASS INDEX DOCD: CPT | Mod: CPTII,,,

## 2024-11-05 PROCEDURE — 1160F RVW MEDS BY RX/DR IN RCRD: CPT | Mod: CPTII,,,

## 2024-11-05 PROCEDURE — 99214 OFFICE O/P EST MOD 30 MIN: CPT | Mod: 25,,,

## 2024-11-05 PROCEDURE — 20610 DRAIN/INJ JOINT/BURSA W/O US: CPT | Mod: 50,,,

## 2024-11-05 PROCEDURE — 4010F ACE/ARB THERAPY RXD/TAKEN: CPT | Mod: CPTII,,,

## 2024-11-05 RX ORDER — LIDOCAINE HYDROCHLORIDE 20 MG/ML
3 INJECTION, SOLUTION INFILTRATION; PERINEURAL
Status: DISCONTINUED | OUTPATIENT
Start: 2024-11-05 | End: 2024-11-05 | Stop reason: HOSPADM

## 2024-11-05 RX ADMIN — LIDOCAINE HYDROCHLORIDE 3 ML: 20 INJECTION, SOLUTION INFILTRATION; PERINEURAL at 02:11

## 2024-11-05 NOTE — PROCEDURES
Large Joint Aspiration/Injection: bilateral knee    Date/Time: 11/5/2024 2:30 PM    Performed by: Gwendolyn Hansen FNP  Authorized by: Gwendolyn Hansen FNP    Consent Done?:  Yes (Verbal)  Indications:  Pain  Site marked: the procedure site was marked    Timeout: prior to procedure the correct patient, procedure, and site was verified    Prep: patient was prepped and draped in usual sterile fashion      Local anesthesia used?: Yes    Local anesthetic:  Topical anesthetic  Ultrasonic Guidance for needle placement?: No    Approach:  Superior (superolateral)  Location:  Knee  Laterality:  Bilateral  Site:  Bilateral knee  Medications (Right):  48 mg hylan g-f 20 48 mg/6 mL; 3 mL LIDOcaine HCL 20 mg/ml (2%) 20 mg/mL (2 %)  Medications (Left):  48 mg hylan g-f 20 48 mg/6 mL; 3 mL LIDOcaine HCL 20 mg/ml (2%) 20 mg/mL (2 %)  Patient tolerance:  Patient tolerated the procedure well with no immediate complications

## 2024-11-05 NOTE — PROGRESS NOTES
[facility]  Orthopedic Clinic Note      Chief Complaint:   Chief Complaint   Patient presents with    Injections     Patient here today for bilateral Synvisc injections.      Referring Physician: Gwendolyn Hansen FNP      History of Present Illness:    This is a 54 y.o. year old male presenting with complaints of right knee pain worsening for the last few years.  This knee pain is moderate to severe.  Patient states pain is worse with ambulation and activity, especially squats and lunges.  Patient states pain is global.  He takes prescribed Celebrex and is quite active in a home exercise program with continued symptoms.  09/14/2023 this patient comes in today stating that the corticosteroid injections were not helpful.  He is interested in Synvisc injections.  10/20/2023 patient presents for bilateral knee viscosupplementation injections.  His symptoms are unchanged since his prior visit.  12/08/2023 patient presents for re-evaluation of bilateral knee pain.  He received bilateral knee viscosupplementation injections at his prior visit.  He states that these injections were helpful for approximately 4 weeks.  He would like to discuss repeat corticosteroid injections today.  03/07/2024 patient presents for follow-up on bilateral knee pain.  He received bilateral knee viscosupplementation injections in October and corticosteroid injections at his prior visit.  He reports that his knee pain is improving, but he remains symptomatic especially with lateral movements.  Taking previously prescribed diclofenac.  Utilizing over-the-counter Voltaren gel with some relief.  04/26/2024 Patient presents for bilateral knee viscosupplementation injections.  He received bilateral knee corticosteroid injections at his prior visit.  He states that these were somewhat helpful, but his symptoms have returned.  08/02/2024 patient presents for follow-up on bilateral knee pain.  He received bilateral knee  viscosupplementation injections at his prior visit.  He reports that they were quite helpful until about a week ago.  He is extremely active and continues to exercise regularly.  He is also a  which requires strenuous activity from him as well.  11/05/2024 patient presents for bilateral knee viscosupplementation injections.  He reports decent relief in symptoms following corticosteroid injections received at prior visit.  He has recently lost 20 lb which he feels has helped with his knee pain.      Past Medical History:   Diagnosis Date    Hypertension     Insomnia        Past Surgical History:   Procedure Laterality Date    COLONOSCOPY         Current Outpatient Medications   Medication Sig    albuterol (ACCUNEB) 0.63 mg/3 mL Nebu Take 0.63 mg by nebulization every 6 (six) hours as needed (as needed shortness of breath). Rescue    amLODIPine (NORVASC) 5 MG tablet Take 5 mg by mouth.    anastrozole (ARIMIDEX) 1 mg Tab Take 1 mg by mouth twice a week.    celecoxib (CELEBREX) 200 MG capsule Take 200 mg by mouth daily as needed.    lisinopriL (PRINIVIL,ZESTRIL) 40 MG tablet Take 40 mg by mouth.    semaglutide (OZEMPIC) 0.25 mg or 0.5 mg(2 mg/1.5 mL) pen injector     testosterone cypionate (DEPOTESTOTERONE CYPIONATE) 200 mg/mL injection Inject 200 mg into the muscle every 7 days.    traZODone (DESYREL) 100 MG tablet Take 100 mg by mouth every evening.    zolpidem (AMBIEN) 10 mg Tab Take 5 mg by mouth nightly as needed.    chlorproMAZINE (THORAZINE) 25 MG tablet Take 1 tablet (25 mg total) by mouth 3 (three) times daily as needed (hiccups).     No current facility-administered medications for this visit.       Review of patient's allergies indicates:  No Known Allergies    Family History   Problem Relation Name Age of Onset    Diabetes type II Father Connor Mix     Diabetes Father Connor Mix     Arthritis Mother Pamela Mix     Diabetes Brother Connor Mix        Social History  "    Socioeconomic History    Marital status:    Tobacco Use    Smoking status: Never     Passive exposure: Never    Smokeless tobacco: Never   Substance and Sexual Activity    Alcohol use: Never    Drug use: Never    Sexual activity: Yes     Partners: Female     Birth control/protection: None           Review of Systems:  All review of systems negative except for those stated in the HPI.    Examination:    Vital Signs:    Vitals:    11/05/24 1438   Weight: 93.4 kg (206 lb)   Height: 5' 10" (1.778 m)           Body mass index is 29.56 kg/m².    Physical Examination:  General: Well-developed, well-nourished.  Neuro: Alert and oriented x 3.  Psych: Normal mood and affect.  Card: Regular rate and rhythm  Resp: Respirations regular and unlabored  Bilateral Knee Exam:  No obvious deformity. Range of motion from 0-120 degrees. Negative patella grind and equal subluxation of knee cap medial and lateral < 1cm. Negative patella tendon tenderness. Negative Lachman and anterior drawer test. Negative posterior drawer test. Negative varus and valgus stress test.  Positive medial joint line tenderness.  Positive Denise's medial sided knee pain.  Positive deep knee flexion been test with medial sided knee pain.  Negative lateral joint line tenderness. 4/5 strength and normal skin appearance. Sensibility normal.      Imaging:  Prior four views of the right knee demonstrate moderate degenerative changes with joint space narrowing, particularly in the medial aspect of the tibiofemoral compartment.      Assessment: Primary osteoarthritis of both knees  -     Large Joint Aspiration/Injection: bilateral knee      Plan:  Plan to proceed with bilateral knee viscosupplementation injections today.  Continue previously prescribed Celebrex and topical medication as needed for pain.  Avoid all other over-the-counter anti-inflammatories while taking this medication.  Encouraged ice application, rest, elevation as needed for swelling.  " He will return to clinic in approximately 3 months for re-evaluation with repeat bilateral knee corticosteroid injections.  He and his spouse verbalized understanding of the plan of care with no further questions.      Large Joint Aspiration/Injection: bilateral knee    Date/Time: 11/5/2024 2:30 PM    Performed by: Gwendolyn Hansen FNP  Authorized by: Gwendolyn Hansen FNP    Consent Done?:  Yes (Verbal)  Indications:  Pain  Site marked: the procedure site was marked    Timeout: prior to procedure the correct patient, procedure, and site was verified    Prep: patient was prepped and draped in usual sterile fashion      Local anesthesia used?: Yes    Local anesthetic:  Topical anesthetic  Ultrasonic Guidance for needle placement?: No    Approach:  Superior (superolateral)  Location:  Knee  Laterality:  Bilateral  Site:  Bilateral knee  Medications (Right):  48 mg hylan g-f 20 48 mg/6 mL; 3 mL LIDOcaine HCL 20 mg/ml (2%) 20 mg/mL (2 %)  Medications (Left):  48 mg hylan g-f 20 48 mg/6 mL; 3 mL LIDOcaine HCL 20 mg/ml (2%) 20 mg/mL (2 %)  Patient tolerance:  Patient tolerated the procedure well with no immediate complications      Follow up in about 3 months (around 2/5/2025) for Reevaluation.      DISCLAIMER: This note may have been dictated using voice recognition software and may contain grammatical errors.     NOTE: Consult report sent to referring provider via EP[employer] EMR.

## 2025-01-26 ENCOUNTER — OFFICE VISIT (OUTPATIENT)
Dept: URGENT CARE | Facility: CLINIC | Age: 55
End: 2025-01-26
Payer: COMMERCIAL

## 2025-01-26 VITALS
HEIGHT: 70 IN | OXYGEN SATURATION: 98 % | SYSTOLIC BLOOD PRESSURE: 152 MMHG | DIASTOLIC BLOOD PRESSURE: 97 MMHG | RESPIRATION RATE: 18 BRPM | WEIGHT: 204 LBS | HEART RATE: 82 BPM | TEMPERATURE: 101 F | BODY MASS INDEX: 29.2 KG/M2

## 2025-01-26 DIAGNOSIS — R50.9 FEVER, UNSPECIFIED FEVER CAUSE: Primary | ICD-10-CM

## 2025-01-26 DIAGNOSIS — Z20.828 EXPOSURE TO THE FLU: ICD-10-CM

## 2025-01-26 DIAGNOSIS — J10.1 INFLUENZA A: ICD-10-CM

## 2025-01-26 LAB
CTP QC/QA: YES
CTP QC/QA: YES
FLUAV AG NPH QL: POSITIVE
FLUBV AG NPH QL: NEGATIVE
SARS-COV-2 AG RESP QL IA.RAPID: NEGATIVE

## 2025-01-26 PROCEDURE — 87804 INFLUENZA ASSAY W/OPTIC: CPT | Mod: QW,,, | Performed by: NURSE PRACTITIONER

## 2025-01-26 PROCEDURE — 87811 SARS-COV-2 COVID19 W/OPTIC: CPT | Mod: QW,,, | Performed by: NURSE PRACTITIONER

## 2025-01-26 PROCEDURE — 99213 OFFICE O/P EST LOW 20 MIN: CPT | Mod: ,,, | Performed by: NURSE PRACTITIONER

## 2025-01-26 RX ORDER — OSELTAMIVIR PHOSPHATE 75 MG/1
75 CAPSULE ORAL 2 TIMES DAILY
Qty: 10 CAPSULE | Refills: 0 | Status: SHIPPED | OUTPATIENT
Start: 2025-01-26 | End: 2025-01-31

## 2025-01-26 RX ORDER — PROMETHAZINE HYDROCHLORIDE AND DEXTROMETHORPHAN HYDROBROMIDE 6.25; 15 MG/5ML; MG/5ML
5 SYRUP ORAL
Qty: 120 ML | Refills: 0 | Status: SHIPPED | OUTPATIENT
Start: 2025-01-26

## 2025-01-26 NOTE — LETTER
January 26, 2025      [facility]  [address], [address]  [place] [address]  Phone: [phone]  Fax: [phone]       Patient: Hang Mix   YOB: 1970  Date of Visit: 01/26/2025    To Whom It May Concern:    Tania Mix  was at [facility] on 01/26/2025. The patient may return to work/school on 1/31/2025 with no restrictions. If you have any questions or concerns, or if I can be of further assistance, please do not hesitate to contact me.    Sincerely,    Sherry Jackson LPN

## 2025-01-26 NOTE — PROGRESS NOTES
"Subjective:      Patient ID: Hang Mix is a 54 y.o. male.    Vitals:  height is 5' 10" (1.778 m) and weight is 92.5 kg (204 lb). His oral temperature is 100.6 °F (38.1 °C) (abnormal). His blood pressure is 152/97 (abnormal) and his pulse is 82. His respiration is 18 and oxygen saturation is 98%.     Chief Complaint: Cough     Patient is a 54 y.o. male who presents to urgent care with complaints of fever (Tmax 102.1), cough, congestion, headache, body aches x 2 days. Alleviating factors include Dayquil, Nyquil, Celebrex, Albuterol with mild amount of relief. Patient denies nausea, vomiting, diarrhea.       Constitution: Positive for chills, fatigue and fever.   HENT:  Positive for congestion.    Neck: neck negative.   Cardiovascular: Negative.    Eyes: Negative.    Respiratory:  Positive for cough.    Gastrointestinal: Negative.    Endocrine: negative.   Genitourinary: Negative.    Musculoskeletal: Negative.    Skin: Negative.    Allergic/Immunologic: Negative.    Neurological:  Positive for headaches.   Hematologic/Lymphatic: Negative.    Psychiatric/Behavioral: Negative.        Objective:     Physical Exam   Constitutional: He is oriented to person, place, and time. He appears well-developed. He is cooperative. He appears ill.   HENT:   Head: Normocephalic and atraumatic.   Ears:   Right Ear: Hearing, tympanic membrane, external ear and ear canal normal.   Left Ear: Hearing, tympanic membrane, external ear and ear canal normal.   Nose: Rhinorrhea and congestion present. No mucosal edema or nasal deformity. No epistaxis. Right sinus exhibits no maxillary sinus tenderness and no frontal sinus tenderness. Left sinus exhibits no maxillary sinus tenderness and no frontal sinus tenderness.   Mouth/Throat: Uvula is midline, oropharynx is clear and moist and mucous membranes are normal. Mucous membranes are moist. No trismus in the jaw. Normal dentition. No uvula swelling. No posterior oropharyngeal erythema. "   Eyes: Conjunctivae and lids are normal.   Neck: Trachea normal and phonation normal. Neck supple.   Cardiovascular: Normal rate, regular rhythm, normal heart sounds and normal pulses.   Pulmonary/Chest: Effort normal and breath sounds normal. No respiratory distress.   Abdominal: Normal appearance and bowel sounds are normal. Soft.   Musculoskeletal: Normal range of motion.         General: Normal range of motion.   Lymphadenopathy:     He has no cervical adenopathy.   Neurological: He is alert and oriented to person, place, and time. He exhibits normal muscle tone.   Skin: Skin is warm, dry and intact. Capillary refill takes less than 2 seconds.   Psychiatric: His speech is normal and behavior is normal. Judgment and thought content normal.   Nursing note and vitals reviewed.         Previous History      Review of patient's allergies indicates:  No Known Allergies    Past Medical History:   Diagnosis Date    Hypertension     Insomnia      Current Outpatient Medications   Medication Instructions    albuterol (ACCUNEB) 0.63 mg, Every 6 hours PRN    amLODIPine (NORVASC) 5 mg    anastrozole (ARIMIDEX) 1 mg, Twice weekly    celecoxib (CELEBREX) 200 mg, Daily PRN    chlorproMAZINE (THORAZINE) 25 mg, Oral, 3 times daily PRN    lisinopriL (PRINIVIL,ZESTRIL) 40 mg    oseltamivir (TAMIFLU) 75 mg, Oral, 2 times daily    promethazine-dextromethorphan (PROMETHAZINE-DM) 6.25-15 mg/5 mL Syrp 5 mLs, Oral, Every 6-8 hours PRN, May cause sedation.  Do not drive or operate heavy machinery after taking this medication.    semaglutide (OZEMPIC) 0.25 mg or 0.5 mg(2 mg/1.5 mL) pen injector     testosterone cypionate (DEPOTESTOTERONE CYPIONATE) 200 mg, Every 7 days    traZODone (DESYREL) 100 mg, Nightly    zolpidem (AMBIEN) 5 mg, Nightly PRN     Past Surgical History:   Procedure Laterality Date    COLONOSCOPY       Family History   Problem Relation Name Age of Onset    Diabetes type II Father Connor Blairtiffany     Diabetes Father  "Connor Blairtiffany     Arthritis Mother Pamela Mix     Diabetes Brother Connor Mix        Social History     Tobacco Use    Smoking status: Never     Passive exposure: Never    Smokeless tobacco: Never   Substance Use Topics    Alcohol use: Never    Drug use: Never        Physical Exam      Vital Signs Reviewed   BP (!) 152/97   Pulse 82   Temp (!) 100.6 °F (38.1 °C) (Oral)   Resp 18   Ht 5' 10" (1.778 m)   Wt 92.5 kg (204 lb)   SpO2 98%   BMI 29.27 kg/m²        Procedures    Procedures     Labs     Results for orders placed or performed in visit on 01/26/25   POCT Influenza A/B    Collection Time: 01/26/25 10:51 AM   Result Value Ref Range    Rapid Influenza A Ag Positive (A) Negative    Rapid Influenza B Ag Negative Negative     Acceptable Yes    SARS Coronavirus 2 Antigen, POCT Manual Read    Collection Time: 01/26/25 10:59 AM   Result Value Ref Range    SARS Coronavirus 2 Antigen Negative Negative     Acceptable Yes      Assessment:     1. Fever, unspecified fever cause    2. Influenza A    3. Exposure to the flu        Plan:   Instructions:          Flu A positive  Medications sent to pharmacy  Vitamin-C 1000 mg twice a day  Zinc 50 mg once a day  Tamiflu is preferred flu medication to take if covered by insurance  Increase fluid intake. Monitor for fever. Take tylenol/acetaminophen or advil/ibuprofen as needed for headache, bodyaches or fever.   Treat your symptoms like the common cold, take Delysm/dimetapp/robitussin as needed for cough, claritin, flonase, mucinex for congestion, for example.   Complications for flu include pneumonia, bronchitis, and sinusitis.   Stay home until you are fever free for at least 24 hours without the use of fever reducing medication and your symptoms have improved.   If your symptoms worsen, or you develop shortness of breath, worsening of cough, or fever over 103, seek medical attention immediately.       Fever, unspecified " fever cause  -     SARS Coronavirus 2 Antigen, POCT Manual Read  -     POCT Influenza A/B  -     promethazine-dextromethorphan (PROMETHAZINE-DM) 6.25-15 mg/5 mL Syrp; Take 5 mLs by mouth every 6 to 8 hours as needed (cough). May cause sedation.  Do not drive or operate heavy machinery after taking this medication.  Dispense: 120 mL; Refill: 0  -     oseltamivir (TAMIFLU) 75 MG capsule; Take 1 capsule (75 mg total) by mouth 2 (two) times daily. for 5 days  Dispense: 10 capsule; Refill: 0    Influenza A  -     promethazine-dextromethorphan (PROMETHAZINE-DM) 6.25-15 mg/5 mL Syrp; Take 5 mLs by mouth every 6 to 8 hours as needed (cough). May cause sedation.  Do not drive or operate heavy machinery after taking this medication.  Dispense: 120 mL; Refill: 0  -     oseltamivir (TAMIFLU) 75 MG capsule; Take 1 capsule (75 mg total) by mouth 2 (two) times daily. for 5 days  Dispense: 10 capsule; Refill: 0    Exposure to the flu  -     promethazine-dextromethorphan (PROMETHAZINE-DM) 6.25-15 mg/5 mL Syrp; Take 5 mLs by mouth every 6 to 8 hours as needed (cough). May cause sedation.  Do not drive or operate heavy machinery after taking this medication.  Dispense: 120 mL; Refill: 0  -     oseltamivir (TAMIFLU) 75 MG capsule; Take 1 capsule (75 mg total) by mouth 2 (two) times daily. for 5 days  Dispense: 10 capsule; Refill: 0

## 2025-01-26 NOTE — PATIENT INSTRUCTIONS
Instructions:          Flu A positive  Medications sent to pharmacy  Vitamin-C 1000 mg twice a day  Zinc 50 mg once a day  Tamiflu is preferred flu medication to take if covered by insurance  Increase fluid intake. Monitor for fever. Take tylenol/acetaminophen or advil/ibuprofen as needed for headache, bodyaches or fever.   Treat your symptoms like the common cold, take Delysm/dimetapp/robitussin as needed for cough, claritin, flonase, mucinex for congestion, for example.   Complications for flu include pneumonia, bronchitis, and sinusitis.   Stay home until you are fever free for at least 24 hours without the use of fever reducing medication and your symptoms have improved.   If your symptoms worsen, or you develop shortness of breath, worsening of cough, or fever over 103, seek medical attention immediately.

## 2025-02-06 ENCOUNTER — OFFICE VISIT (OUTPATIENT)
Dept: ORTHOPEDICS | Facility: CLINIC | Age: 55
End: 2025-02-06
Payer: COMMERCIAL

## 2025-02-06 ENCOUNTER — HOSPITAL ENCOUNTER (OUTPATIENT)
Dept: RADIOLOGY | Facility: CLINIC | Age: 55
Discharge: HOME | End: 2025-02-06
Payer: COMMERCIAL

## 2025-02-06 VITALS
DIASTOLIC BLOOD PRESSURE: 85 MMHG | WEIGHT: 203.94 LBS | HEIGHT: 70 IN | SYSTOLIC BLOOD PRESSURE: 130 MMHG | BODY MASS INDEX: 29.2 KG/M2 | HEART RATE: 76 BPM

## 2025-02-06 DIAGNOSIS — M17.0 PRIMARY OSTEOARTHRITIS OF BOTH KNEES: ICD-10-CM

## 2025-02-06 DIAGNOSIS — M17.0 PRIMARY OSTEOARTHRITIS OF BOTH KNEES: Primary | ICD-10-CM

## 2025-02-06 PROCEDURE — 99214 OFFICE O/P EST MOD 30 MIN: CPT | Mod: 25,,,

## 2025-02-06 PROCEDURE — 3079F DIAST BP 80-89 MM HG: CPT | Mod: CPTII,,,

## 2025-02-06 PROCEDURE — 20610 DRAIN/INJ JOINT/BURSA W/O US: CPT | Mod: 50,,,

## 2025-02-06 PROCEDURE — 3075F SYST BP GE 130 - 139MM HG: CPT | Mod: CPTII,,,

## 2025-02-06 PROCEDURE — 73564 X-RAY EXAM KNEE 4 OR MORE: CPT | Mod: ,,,

## 2025-02-06 PROCEDURE — 3008F BODY MASS INDEX DOCD: CPT | Mod: CPTII,,,

## 2025-02-06 PROCEDURE — 1160F RVW MEDS BY RX/DR IN RCRD: CPT | Mod: CPTII,,,

## 2025-02-06 PROCEDURE — 1159F MED LIST DOCD IN RCRD: CPT | Mod: CPTII,,,

## 2025-02-06 RX ORDER — LIDOCAINE HYDROCHLORIDE 20 MG/ML
2 INJECTION, SOLUTION INFILTRATION; PERINEURAL
Status: DISCONTINUED | OUTPATIENT
Start: 2025-02-06 | End: 2025-02-06 | Stop reason: HOSPADM

## 2025-02-06 RX ORDER — BETAMETHASONE SODIUM PHOSPHATE AND BETAMETHASONE ACETATE 3; 3 MG/ML; MG/ML
6 INJECTION, SUSPENSION INTRA-ARTICULAR; INTRALESIONAL; INTRAMUSCULAR; SOFT TISSUE
Status: DISCONTINUED | OUTPATIENT
Start: 2025-02-06 | End: 2025-02-06 | Stop reason: HOSPADM

## 2025-02-06 RX ADMIN — LIDOCAINE HYDROCHLORIDE 2 ML: 20 INJECTION, SOLUTION INFILTRATION; PERINEURAL at 02:02

## 2025-02-06 RX ADMIN — BETAMETHASONE SODIUM PHOSPHATE AND BETAMETHASONE ACETATE 6 MG: 3; 3 INJECTION, SUSPENSION INTRA-ARTICULAR; INTRALESIONAL; INTRAMUSCULAR; SOFT TISSUE at 02:02

## 2025-02-06 NOTE — PROGRESS NOTES
[facility]edic Clinic  Orthopedic Clinic Note      Chief Complaint:   Chief Complaint   Patient presents with    Follow-up     Fernando Synvisc injections last given 11/5/24- Last Synvisc injection helped with the pain and lost 40 lbs. Has swelling at times.      Referring Physician: No ref. provider found      History of Present Illness:    This is a 54 y.o. year old male presenting with complaints of right knee pain worsening for the last few years.  This knee pain is moderate to severe.  Patient states pain is worse with ambulation and activity, especially squats and lunges.  Patient states pain is global.  He takes prescribed Celebrex and is quite active in a home exercise program with continued symptoms.  09/14/2023 this patient comes in today stating that the corticosteroid injections were not helpful.  He is interested in Synvisc injections.  10/20/2023 patient presents for bilateral knee viscosupplementation injections.  His symptoms are unchanged since his prior visit.  12/08/2023 patient presents for re-evaluation of bilateral knee pain.  He received bilateral knee viscosupplementation injections at his prior visit.  He states that these injections were helpful for approximately 4 weeks.  He would like to discuss repeat corticosteroid injections today.  03/07/2024 patient presents for follow-up on bilateral knee pain.  He received bilateral knee viscosupplementation injections in October and corticosteroid injections at his prior visit.  He reports that his knee pain is improving, but he remains symptomatic especially with lateral movements.  Taking previously prescribed diclofenac.  Utilizing over-the-counter Voltaren gel with some relief.  04/26/2024 Patient presents for bilateral knee viscosupplementation injections.  He received bilateral knee corticosteroid injections at his prior visit.  He states that these were somewhat helpful, but his symptoms have returned.  08/02/2024 patient presents for  follow-up on bilateral knee pain.  He received bilateral knee viscosupplementation injections at his prior visit.  He reports that they were quite helpful until about a week ago.  He is extremely active and continues to exercise regularly.  He is also a  which requires strenuous activity from him as well.  11/05/2024 patient presents for bilateral knee viscosupplementation injections.  He reports decent relief in symptoms following corticosteroid injections received at prior visit.  He has recently lost 20 lb which he feels has helped with his knee pain.  02/06/2025 patient presents for bilateral knee corticosteroid injections today.  He received bilateral knee viscosupplementation injections at his prior visit.  He states that his knee pain continues to remain well controlled.  He is working on weight loss.  Participating in a home exercise program.      Past Medical History:   Diagnosis Date    Hypertension     Insomnia        Past Surgical History:   Procedure Laterality Date    COLONOSCOPY         Current Outpatient Medications   Medication Sig    albuterol (ACCUNEB) 0.63 mg/3 mL Nebu Take 0.63 mg by nebulization every 6 (six) hours as needed (as needed shortness of breath). Rescue    amLODIPine (NORVASC) 5 MG tablet Take 5 mg by mouth.    anastrozole (ARIMIDEX) 1 mg Tab Take 1 mg by mouth twice a week.    celecoxib (CELEBREX) 200 MG capsule Take 200 mg by mouth daily as needed.    lisinopriL (PRINIVIL,ZESTRIL) 40 MG tablet Take 40 mg by mouth.    semaglutide (OZEMPIC) 0.25 mg or 0.5 mg(2 mg/1.5 mL) pen injector     testosterone cypionate (DEPOTESTOTERONE CYPIONATE) 200 mg/mL injection Inject 200 mg into the muscle every 7 days.    traZODone (DESYREL) 100 MG tablet Take 100 mg by mouth every evening.    zolpidem (AMBIEN) 10 mg Tab Take 5 mg by mouth nightly as needed.    chlorproMAZINE (THORAZINE) 25 MG tablet Take 1 tablet (25 mg total) by mouth 3 (three) times daily as needed (hiccups).     "promethazine-dextromethorphan (PROMETHAZINE-DM) 6.25-15 mg/5 mL Syrp Take 5 mLs by mouth every 6 to 8 hours as needed (cough). May cause sedation.  Do not drive or operate heavy machinery after taking this medication. (Patient not taking: Reported on 2/6/2025)     No current facility-administered medications for this visit.       Review of patient's allergies indicates:  No Known Allergies    Family History   Problem Relation Name Age of Onset    Diabetes type II Father Connor Mix     Diabetes Father Connor Mix     Arthritis Mother Pamela Mix     Diabetes Brother Connor Mix        Social History     Socioeconomic History    Marital status:    Tobacco Use    Smoking status: Never     Passive exposure: Never    Smokeless tobacco: Never   Substance and Sexual Activity    Alcohol use: Never    Drug use: Never    Sexual activity: Yes     Partners: Female     Birth control/protection: None           Review of Systems:  All review of systems negative except for those stated in the HPI.    Examination:    Vital Signs:    Vitals:    02/06/25 1437   BP: 130/85   Pulse: 76   Weight: 92.5 kg (203 lb 14.8 oz)   Height: 5' 10" (1.778 m)           Body mass index is 29.26 kg/m².    Physical Examination:  General: Well-developed, well-nourished.  Neuro: Alert and oriented x 3.  Psych: Normal mood and affect.  Card: Regular rate and rhythm  Resp: Respirations regular and unlabored  Bilateral Knee Exam:  No obvious deformity. Range of motion from 0-120 degrees. Negative patella grind and equal subluxation of knee cap medial and lateral < 1cm. Negative patella tendon tenderness. Negative Lachman and anterior drawer test. Negative posterior drawer test. Negative varus and valgus stress test.  Positive medial joint line tenderness.  Positive Denise's medial sided knee pain.  Positive deep knee flexion been test with medial sided knee pain.  Negative lateral joint line tenderness. 4/5 strength and " normal skin appearance. Sensibility normal.      Imaging:  X-rays taken today interpreted personally by me of four views of the right knee demonstrate moderate degenerative changes with joint space narrowing, particularly in the medial aspect of the tibiofemoral compartment.      Assessment: Primary osteoarthritis of both knees  -     X-Ray Knee Complete 4 Or More Views Bilat; Future; Expected date: 02/06/2025  -     Prior authorization Order  -     Large Joint Aspiration/Injection: bilateral knee      Plan:  X-rays were reviewed with the patient.  Plan to proceed with bilateral knee corticosteroid injections today.  Continue previously prescribed Celebrex and topical medication as needed for pain.  Avoid all other over-the-counter anti-inflammatories while taking this medication.  Encouraged ice application, rest, elevation as needed for swelling.  He will return to clinic in approximately 3 months for re-evaluation with repeat bilateral knee viscosupplementation injections.  He verbalized understanding of the plan of care with no further questions.      Large Joint Aspiration/Injection: bilateral knee    Date/Time: 2/6/2025 2:30 PM    Performed by: Gwendolyn Hansen FNP  Authorized by: Gwendolyn Hansen FNP    Consent Done?:  Yes (Verbal)  Indications:  Arthritis and pain  Site marked: the procedure site was marked    Timeout: prior to procedure the correct patient, procedure, and site was verified    Prep: patient was prepped and draped in usual sterile fashion    Approach:  Anterolateral  Location:  Knee  Laterality:  Bilateral  Site:  Bilateral knee  Medications (Right):  2 mL LIDOcaine HCL 20 mg/ml (2%) 20 mg/mL (2 %); 6 mg betamethasone acetate-betamethasone sodium phosphate 6 mg/mL  Medications (Left):  2 mL LIDOcaine HCL 20 mg/ml (2%) 20 mg/mL (2 %); 6 mg betamethasone acetate-betamethasone sodium phosphate 6 mg/mL  Patient tolerance:  Patient tolerated the procedure well with no immediate  complications      Follow up in about 3 months (around 5/6/2025) for Bilateral knee viscosupplementation injections.      DISCLAIMER: This note may have been dictated using voice recognition software and may contain grammatical errors.     NOTE: Consult report sent to referring provider via EPIC EMR.

## 2025-02-06 NOTE — PROCEDURES
Large Joint Aspiration/Injection: bilateral knee    Date/Time: 2/6/2025 2:30 PM    Performed by: Gwendolyn Hansen FNP  Authorized by: Gwendolyn Hansen FNP    Consent Done?:  Yes (Verbal)  Indications:  Arthritis and pain  Site marked: the procedure site was marked    Timeout: prior to procedure the correct patient, procedure, and site was verified    Prep: patient was prepped and draped in usual sterile fashion    Approach:  Anterolateral  Location:  Knee  Laterality:  Bilateral  Site:  Bilateral knee  Medications (Right):  2 mL LIDOcaine HCL 20 mg/ml (2%) 20 mg/mL (2 %); 6 mg betamethasone acetate-betamethasone sodium phosphate 6 mg/mL  Medications (Left):  2 mL LIDOcaine HCL 20 mg/ml (2%) 20 mg/mL (2 %); 6 mg betamethasone acetate-betamethasone sodium phosphate 6 mg/mL  Patient tolerance:  Patient tolerated the procedure well with no immediate complications

## 2025-05-08 ENCOUNTER — OFFICE VISIT (OUTPATIENT)
Dept: ORTHOPEDICS | Facility: CLINIC | Age: 55
End: 2025-05-08
Payer: COMMERCIAL

## 2025-05-08 VITALS
HEIGHT: 70 IN | BODY MASS INDEX: 29.2 KG/M2 | SYSTOLIC BLOOD PRESSURE: 147 MMHG | HEART RATE: 72 BPM | DIASTOLIC BLOOD PRESSURE: 90 MMHG | WEIGHT: 203.94 LBS

## 2025-05-08 DIAGNOSIS — M75.21 BICIPITAL TENDONITIS OF RIGHT SHOULDER: ICD-10-CM

## 2025-05-08 DIAGNOSIS — M17.0 PRIMARY OSTEOARTHRITIS OF BOTH KNEES: Primary | ICD-10-CM

## 2025-05-08 RX ORDER — LIDOCAINE HYDROCHLORIDE 20 MG/ML
3 INJECTION, SOLUTION INFILTRATION; PERINEURAL
Status: DISCONTINUED | OUTPATIENT
Start: 2025-05-08 | End: 2025-05-08 | Stop reason: HOSPADM

## 2025-05-08 RX ADMIN — LIDOCAINE HYDROCHLORIDE 3 ML: 20 INJECTION, SOLUTION INFILTRATION; PERINEURAL at 02:05

## 2025-05-08 NOTE — PROCEDURES
Large Joint Aspiration/Injection: bilateral knee    Date/Time: 5/8/2025 2:30 PM    Performed by: Gwendolyn Hansen FNP  Authorized by: Gwendolyn Hansen FNP    Consent Done?:  Yes (Verbal)  Indications:  Pain and arthritis  Site marked: the procedure site was marked    Timeout: prior to procedure the correct patient, procedure, and site was verified    Prep: patient was prepped and draped in usual sterile fashion      Local anesthesia used?: Yes    Local anesthetic:  Topical anesthetic  Ultrasonic Guidance for needle placement?: No    Approach:  Superior (superolateral)  Location:  Knee  Laterality:  Bilateral  Site:  Bilateral knee  Medications (Right):  48 mg hylan g-f 20 48 mg/6 mL; 3 mL LIDOcaine HCL 20 mg/ml (2%) 20 mg/mL (2 %)  Aspirate (Right) amount (mL):  6  Aspirate (Right):  Clear  Medications (Left):  48 mg hylan g-f 20 48 mg/6 mL; 3 mL LIDOcaine HCL 20 mg/ml (2%) 20 mg/mL (2 %)  Patient tolerance:  Patient tolerated the procedure well with no immediate complications

## 2025-05-08 NOTE — PROGRESS NOTES
[facility]  [facility]thopedic Clinic Note      Chief Complaint:   Chief Complaint   Patient presents with    Injections     Patient is here for natividad knee injections. Last Synvisc given 11/5/24. Stated he would like the cream he brought in refilled.      Referring Physician: Gwendolyn Hansen FNP      History of Present Illness:    This is a 54 y.o. year old male presenting with complaints of right knee pain worsening for the last few years.  This knee pain is moderate to severe.  Patient states pain is worse with ambulation and activity, especially squats and lunges.  Patient states pain is global.  He takes prescribed Celebrex and is quite active in a home exercise program with continued symptoms.  09/14/2023 this patient comes in today stating that the corticosteroid injections were not helpful.  He is interested in Synvisc injections.  10/20/2023 patient presents for bilateral knee viscosupplementation injections.  His symptoms are unchanged since his prior visit.  12/08/2023 patient presents for re-evaluation of bilateral knee pain.  He received bilateral knee viscosupplementation injections at his prior visit.  He states that these injections were helpful for approximately 4 weeks.  He would like to discuss repeat corticosteroid injections today.  03/07/2024 patient presents for follow-up on bilateral knee pain.  He received bilateral knee viscosupplementation injections in October and corticosteroid injections at his prior visit.  He reports that his knee pain is improving, but he remains symptomatic especially with lateral movements.  Taking previously prescribed diclofenac.  Utilizing over-the-counter Voltaren gel with some relief.  04/26/2024 Patient presents for bilateral knee viscosupplementation injections.  He received bilateral knee corticosteroid injections at his prior visit.  He states that these were somewhat helpful, but his symptoms have returned.  08/02/2024 patient presents for follow-up  on bilateral knee pain.  He received bilateral knee viscosupplementation injections at his prior visit.  He reports that they were quite helpful until about a week ago.  He is extremely active and continues to exercise regularly.  He is also a  which requires strenuous activity from him as well.  11/05/2024 patient presents for bilateral knee viscosupplementation injections.  He reports decent relief in symptoms following corticosteroid injections received at prior visit.  He has recently lost 20 lb which he feels has helped with his knee pain.  02/06/2025 patient presents for bilateral knee corticosteroid injections today.  He received bilateral knee viscosupplementation injections at his prior visit.  He states that his knee pain continues to remain well controlled.  He is working on weight loss.  Participating in a home exercise program.  05/08/2025 patient presents for bilateral knee viscosupplementation injections.  He reports improvement in his knee pain since his recent weight loss.  He also mentions right shoulder pain at the end of his visit.  Most notable over the anterior aspect of his shoulder and radiating into his biceps.  He is extremely active and participates in a weightlifting program.      Past Medical History:   Diagnosis Date    Hypertension     Insomnia        Past Surgical History:   Procedure Laterality Date    COLONOSCOPY         Current Outpatient Medications   Medication Sig    albuterol (ACCUNEB) 0.63 mg/3 mL Nebu Take 0.63 mg by nebulization every 6 (six) hours as needed (as needed shortness of breath). Rescue    amLODIPine (NORVASC) 5 MG tablet Take 5 mg by mouth.    anastrozole (ARIMIDEX) 1 mg Tab Take 1 mg by mouth twice a week.    celecoxib (CELEBREX) 200 MG capsule Take 200 mg by mouth daily as needed.    KETOPROFEN, BULK, TOP     lisinopriL (PRINIVIL,ZESTRIL) 40 MG tablet Take 40 mg by mouth.    semaglutide (OZEMPIC) 0.25 mg or 0.5 mg(2 mg/1.5 mL) pen injector      "testosterone cypionate (DEPOTESTOTERONE CYPIONATE) 200 mg/mL injection Inject 200 mg into the muscle every 7 days.    traZODone (DESYREL) 100 MG tablet Take 100 mg by mouth every evening.    zolpidem (AMBIEN) 10 mg Tab Take 5 mg by mouth nightly as needed.    chlorproMAZINE (THORAZINE) 25 MG tablet Take 1 tablet (25 mg total) by mouth 3 (three) times daily as needed (hiccups).    promethazine-dextromethorphan (PROMETHAZINE-DM) 6.25-15 mg/5 mL Syrp Take 5 mLs by mouth every 6 to 8 hours as needed (cough). May cause sedation.  Do not drive or operate heavy machinery after taking this medication. (Patient not taking: Reported on 5/8/2025)     No current facility-administered medications for this visit.       Review of patient's allergies indicates:  No Known Allergies    Family History   Problem Relation Name Age of Onset    Diabetes type II Father Connor SUMMERS Maria Del Rosario     Diabetes Father Cononr KRISTOPHER Maria Del Rosario     Arthritis Mother Pamela REESE Maria Del Rosario     Diabetes Brother Connor Mix        Social History     Socioeconomic History    Marital status:    Tobacco Use    Smoking status: Never     Passive exposure: Never    Smokeless tobacco: Never   Substance and Sexual Activity    Alcohol use: Never    Drug use: Never    Sexual activity: Yes     Partners: Female     Birth control/protection: None           Review of Systems:  All review of systems negative except for those stated in the HPI.    Examination:    Vital Signs:    Vitals:    05/08/25 1443   BP: (!) 147/90   Pulse: 72   Weight: 92.5 kg (203 lb 14.8 oz)   Height: 5' 10" (1.778 m)           Body mass index is 29.26 kg/m².    Physical Examination:  General: Well-developed, well-nourished.  Neuro: Alert and oriented x 3.  Psych: Normal mood and affect.  Card: Regular rate and rhythm  Resp: Respirations regular and unlabored  Right Shoulder Exam:  No obvious deformity. No medial or lateral scapula winging. Forward flexion to 170 degrees and abduction to 170 " degrees and external rotation 80 degrees is and internal rotation is 80 degrees. Negative empty can, Whipple, Drop Arm Test, Zepeda, impingement, AC joint tenderness.  Positive biceps groove tenderness, Freed´s, Yergason´s, Speed test. Negative Apprehension and Relocation test. 5/5 strength, normal skin appearance and palpable pulses distally. Sensibility normal.  Bilateral Knee Exam:  No obvious deformity. Range of motion from 0-120 degrees. Negative patella grind and equal subluxation of knee cap medial and lateral < 1cm. Negative patella tendon tenderness. Negative Lachman and anterior drawer test. Negative posterior drawer test. Negative varus and valgus stress test.  Positive medial joint line tenderness.  Positive Denise's medial sided knee pain.  Positive deep knee flexion been test with medial sided knee pain.  Negative lateral joint line tenderness. 4/5 strength and normal skin appearance. Sensibility normal.      Imaging:  Prior four views of the right knee demonstrate moderate degenerative changes with joint space narrowing, particularly in the medial aspect of the tibiofemoral compartment.      Assessment: Primary osteoarthritis of both knees  -     Large Joint Aspiration/Injection: bilateral knee    Bicipital tendonitis of right shoulder      Plan:  Plan to proceed with bilateral knee viscosupplementation injections today.  Continue previously prescribed Celebrex and topical medication as needed for pain.  Avoid all other over-the-counter anti-inflammatories while taking this medication.  Encouraged ice application, rest, elevation as needed for swelling.  He will return to clinic in approximately 3 months for re-evaluation with repeat bilateral knee viscosupplementation injections.  He verbalized understanding of the plan of care with no further questions.    He also mentioned right shoulder pain at the end of his visit.  Plan to have him return to clinic for right shoulder x-ray and possible right  bicipital groove corticosteroid injection with Dr. Mortensen.  He verbalized understanding of the plan of care with no further questions.      Large Joint Aspiration/Injection: bilateral knee    Date/Time: 5/8/2025 2:30 PM    Performed by: Gwendolyn Hansen FNP  Authorized by: Gwendolyn Hansen FNP    Consent Done?:  Yes (Verbal)  Indications:  Pain and arthritis  Site marked: the procedure site was marked    Timeout: prior to procedure the correct patient, procedure, and site was verified    Prep: patient was prepped and draped in usual sterile fashion      Local anesthesia used?: Yes    Local anesthetic:  Topical anesthetic  Ultrasonic Guidance for needle placement?: No    Approach:  Superior (superolateral)  Location:  Knee  Laterality:  Bilateral  Site:  Bilateral knee  Medications (Right):  48 mg hylan g-f 20 48 mg/6 mL; 3 mL LIDOcaine HCL 20 mg/ml (2%) 20 mg/mL (2 %)  Aspirate (Right) amount (mL):  6  Aspirate (Right):  Clear  Medications (Left):  48 mg hylan g-f 20 48 mg/6 mL; 3 mL LIDOcaine HCL 20 mg/ml (2%) 20 mg/mL (2 %)  Patient tolerance:  Patient tolerated the procedure well with no immediate complications      Follow up in about 3 months (around 8/8/2025).      DISCLAIMER: This note may have been dictated using voice recognition software and may contain grammatical errors.     NOTE: Consult report sent to referring provider via [email or web address].

## 2025-05-23 ENCOUNTER — OFFICE VISIT (OUTPATIENT)
Dept: ORTHOPEDICS | Facility: CLINIC | Age: 55
End: 2025-05-23
Payer: COMMERCIAL

## 2025-05-23 ENCOUNTER — HOSPITAL ENCOUNTER (OUTPATIENT)
Dept: RADIOLOGY | Facility: CLINIC | Age: 55
Discharge: HOME | End: 2025-05-23
Attending: ORTHOPAEDIC SURGERY
Payer: COMMERCIAL

## 2025-05-23 VITALS
SYSTOLIC BLOOD PRESSURE: 134 MMHG | BODY MASS INDEX: 29.2 KG/M2 | HEART RATE: 63 BPM | WEIGHT: 203.94 LBS | DIASTOLIC BLOOD PRESSURE: 90 MMHG | HEIGHT: 70 IN

## 2025-05-23 DIAGNOSIS — M75.21 BICIPITAL TENDONITIS OF RIGHT SHOULDER: ICD-10-CM

## 2025-05-23 DIAGNOSIS — R21 SKIN RASH: ICD-10-CM

## 2025-05-23 DIAGNOSIS — M75.111 NONTRAUMATIC INCOMPLETE TEAR OF RIGHT ROTATOR CUFF: Primary | ICD-10-CM

## 2025-05-23 PROCEDURE — 73030 X-RAY EXAM OF SHOULDER: CPT | Mod: RT,,, | Performed by: ORTHOPAEDIC SURGERY

## 2025-05-23 RX ORDER — TRIAMCINOLONE ACETONIDE 1 MG/G
CREAM TOPICAL 2 TIMES DAILY
Qty: 30 G | Refills: 0 | Status: SHIPPED | OUTPATIENT
Start: 2025-05-23

## 2025-05-23 RX ORDER — BETAMETHASONE SODIUM PHOSPHATE AND BETAMETHASONE ACETATE 3; 3 MG/ML; MG/ML
6 INJECTION, SUSPENSION INTRA-ARTICULAR; INTRALESIONAL; INTRAMUSCULAR; SOFT TISSUE
Status: DISCONTINUED | OUTPATIENT
Start: 2025-05-23 | End: 2025-05-23 | Stop reason: HOSPADM

## 2025-05-23 RX ORDER — LIDOCAINE HYDROCHLORIDE 20 MG/ML
2 INJECTION, SOLUTION INFILTRATION; PERINEURAL
Status: DISCONTINUED | OUTPATIENT
Start: 2025-05-23 | End: 2025-05-23 | Stop reason: HOSPADM

## 2025-05-23 RX ORDER — MUPIROCIN 20 MG/G
OINTMENT TOPICAL 2 TIMES DAILY
Qty: 110 G | Refills: 0 | Status: SHIPPED | OUTPATIENT
Start: 2025-05-23

## 2025-05-23 RX ADMIN — LIDOCAINE HYDROCHLORIDE 2 MG: 20 INJECTION, SOLUTION INFILTRATION; PERINEURAL at 09:05

## 2025-05-23 RX ADMIN — BETAMETHASONE SODIUM PHOSPHATE AND BETAMETHASONE ACETATE 6 MG: 3; 3 INJECTION, SUSPENSION INTRA-ARTICULAR; INTRALESIONAL; INTRAMUSCULAR; SOFT TISSUE at 09:05

## 2025-05-23 NOTE — PROGRESS NOTES
"Subjective:      Patient ID: Hang Mix is a 54 y.o. male.    Chief Complaint: Pain of the Right Shoulder (Ongoing pain for about a year. Pain radiates from shoulder into forearm. ROM is limited at times do to pain. Denies any numbness or swelling. Is taking tylenol prn which has some of the pain. )    HPI:     History of Present Illness    CHIEF COMPLAINT:  - Right shoulder pain and weakness    HPI:  Hang presents for evaluation of shoulder pain that started years ago during certain exercises. Pain occurs with side lateral raises, extending from the lateral aspect to the top of the shoulder during hammer curls. Concentrated curls do not affect the pain. He reports recent improvement, describing it as slightly easier, but pain persists. He also notes a loss of strength, which he believes may be related to the shoulder issue.    He describes a skin condition that began as a small lesion and later spread. He attempted treatment with antifungal medication, which turned the affected area purple. He mentions shaving the area and speculates whether it could be ringworm. He denies any itching or pruritus associated with the skin condition.    IMAGING:  - MRI Shoulder    MEDICATIONS:  - GLP-1 agonist: For weight loss, effective  - Antifungal medication: Applied to skin lesion      ROS:  Constitutional: +weight loss  Musculoskeletal: +limb pain, +muscle pain, +limited movement, +diminished activity, +pain with movement  Integumentary: +skin lesion, +rash          Past Medical History:   Diagnosis Date    Hypertension     Insomnia      Past Surgical History:   Procedure Laterality Date    COLONOSCOPY       Social History[1]    Current Medications[2]  Review of patient's allergies indicates:  No Known Allergies    BP (!) 134/90   Pulse 63   Ht 5' 10" (1.778 m)   Wt 92.5 kg (203 lb 14.8 oz)   BMI 29.26 kg/m²     Comprehensive review of systems completed and negative except as per HPI.        Objective: "   General: Well-developed, well-nourished.  Neuro: Alert and oriented x 3.  Psych: Normal mood and affect.  Card: Regular rate and rhythm  Resp: Respirations regular and unlabored    Shoulder Exam:  No obvious deformity. No medial or lateral scapula winging. Forward flexion to 160 degrees and abduction to 160 degrees. Positive empty can,  positive Whipple, Positive drop arm test, Zepeda, impingement, Positive AC joint tenderness. Negative biceps groove tenderness, Positive Freed´s, Yergason´s, Speed test. Negative Apprehension and Relocation test. 4/5 strength, normal skin appearance and palpable pulses distally. Sensibility normal.        Assessment:         1. Nontraumatic incomplete tear of right rotator cuff    2. Bicipital tendonitis of right shoulder    3. Skin rash          Plan:       Orders Placed This Encounter    Large Joint Aspiration/Injection: R subacromial bursa    X-ray Shoulder 2 or More Views Right    MRI Shoulder Without Contrast Right    mupirocin (BACTROBAN) 2 % ointment    triamcinolone acetonide 0.1% (KENALOG) 0.1 % cream        Imaging and exam findings discussed.     Assessment & Plan    PROCEDURES:  - Right shoulder steroid injection to be administered by nurse.  - Topical steroid cream and antibiotic topical prescribed for skin irritation, to be sent to patient's pharmacy.  - Discussed potentially prescribing both antibiotic cream and steroid cream if initial treatment is ineffective.    MEDICATIONS:  - Topical steroid cream for skin irritation.  - Consider antibiotic cream if steroid cream is ineffective.    FOLLOW UP:  - Contact the office if no improvement with steroid cream.   - I am also going to proceed with a MRI of the patient's right shoulder due to the fact that he has been with this right shoulder pain for over a year and it is associated with strenuous activity.        Large Joint Aspiration/Injection: R subacromial bursa    Date/Time: 5/23/2025 9:45 AM    Performed by: Festus  Thierry HUNT MD  Authorized by: Thierry Mortensen MD    Consent Done?:  Yes (Verbal)  Indications:  Pain  Timeout: prior to procedure the correct patient, procedure, and site was verified    Prep: patient was prepped and draped in usual sterile fashion      Details:  Needle Size:  25 G  Approach:  Posterior  Location:  Shoulder  Site:  R subacromial bursa  Medications:  2 mg LIDOcaine HCL 20 mg/ml (2%) 20 mg/mL (2 %); 6 mg betamethasone acetate-betamethasone sodium phosphate 6 mg/mL  Patient tolerance:  Patient tolerated the procedure well with no immediate complications       All questions were answered. Patient happy and in agreement with the plan.     This note was generated with the assistance of ambient listening technology. Verbal consent was obtained by the patient and accompanying visitor(s) for the recording of patient appointment to facilitate this note. I attest to having reviewed and edited the generated note for accuracy, though some syntax or spelling errors may persist. Please contact the author of this note for any clarification.         [1]   Social History  Socioeconomic History    Marital status:    Tobacco Use    Smoking status: Never     Passive exposure: Never    Smokeless tobacco: Never   Substance and Sexual Activity    Alcohol use: Never    Drug use: Never    Sexual activity: Yes     Partners: Female     Birth control/protection: None   [2]   Current Outpatient Medications:     albuterol (ACCUNEB) 0.63 mg/3 mL Nebu, Take 0.63 mg by nebulization every 6 (six) hours as needed (as needed shortness of breath). Rescue, Disp: , Rfl:     amLODIPine (NORVASC) 5 MG tablet, Take 5 mg by mouth., Disp: , Rfl:     anastrozole (ARIMIDEX) 1 mg Tab, Take 1 mg by mouth twice a week., Disp: , Rfl:     celecoxib (CELEBREX) 200 MG capsule, Take 200 mg by mouth daily as needed., Disp: , Rfl:     KETOPROFEN, BULK, TOP, , Disp: , Rfl:     lisinopriL (PRINIVIL,ZESTRIL) 40 MG tablet, Take 40 mg by mouth., Disp: , Rfl:      semaglutide (OZEMPIC) 0.25 mg or 0.5 mg(2 mg/1.5 mL) pen injector, , Disp: , Rfl:     testosterone cypionate (DEPOTESTOTERONE CYPIONATE) 200 mg/mL injection, Inject 200 mg into the muscle every 7 days., Disp: , Rfl:     traZODone (DESYREL) 100 MG tablet, Take 100 mg by mouth every evening., Disp: , Rfl:     zolpidem (AMBIEN) 10 mg Tab, Take 5 mg by mouth nightly as needed., Disp: , Rfl:     chlorproMAZINE (THORAZINE) 25 MG tablet, Take 1 tablet (25 mg total) by mouth 3 (three) times daily as needed (hiccups)., Disp: 9 tablet, Rfl: 0    mupirocin (BACTROBAN) 2 % ointment, Apply topically 2 (two) times daily., Disp: 110 g, Rfl: 0    promethazine-dextromethorphan (PROMETHAZINE-DM) 6.25-15 mg/5 mL Syrp, Take 5 mLs by mouth every 6 to 8 hours as needed (cough). May cause sedation.  Do not drive or operate heavy machinery after taking this medication. (Patient not taking: Reported on 2/6/2025), Disp: 120 mL, Rfl: 0    triamcinolone acetonide 0.1% (KENALOG) 0.1 % cream, Apply topically 2 (two) times daily. Apply in a thin layer to affected area, Disp: 30 g, Rfl: 0

## 2025-05-23 NOTE — PROCEDURES
Large Joint Aspiration/Injection: R subacromial bursa    Date/Time: 5/23/2025 9:45 AM    Performed by: Thierry Mortensen MD  Authorized by: Thierry Mortensen MD    Consent Done?:  Yes (Verbal)  Indications:  Pain  Timeout: prior to procedure the correct patient, procedure, and site was verified    Prep: patient was prepped and draped in usual sterile fashion      Details:  Needle Size:  25 G  Approach:  Posterior  Location:  Shoulder  Site:  R subacromial bursa  Medications:  2 mg LIDOcaine HCL 20 mg/ml (2%) 20 mg/mL (2 %); 6 mg betamethasone acetate-betamethasone sodium phosphate 6 mg/mL  Patient tolerance:  Patient tolerated the procedure well with no immediate complications

## 2025-06-12 ENCOUNTER — OFFICE VISIT (OUTPATIENT)
Dept: ORTHOPEDICS | Facility: CLINIC | Age: 55
End: 2025-06-12
Payer: COMMERCIAL

## 2025-06-12 VITALS
BODY MASS INDEX: 27.58 KG/M2 | WEIGHT: 192.63 LBS | HEIGHT: 70 IN | HEART RATE: 67 BPM | DIASTOLIC BLOOD PRESSURE: 82 MMHG | SYSTOLIC BLOOD PRESSURE: 138 MMHG

## 2025-06-12 DIAGNOSIS — M75.21 BICIPITAL TENDONITIS OF RIGHT SHOULDER: Primary | ICD-10-CM

## 2025-06-12 DIAGNOSIS — M19.011 ARTHROSIS OF RIGHT ACROMIOCLAVICULAR JOINT: ICD-10-CM

## 2025-06-12 NOTE — PROGRESS NOTES
Subjective:      Patient ID: Hang Mix is a 54 y.o. male.    Chief Complaint: Results (RT shoulder MRI results)    HPI:     History of Present Illness    CHIEF COMPLAINT:  - Left shoulder pain    HPI:  Hang presents for follow-up evaluation of shoulder pain following a fall from a truck. Pain is described as mild soreness. An MRI revealed a partial rotator cuff tear and a bone spur causing impingement. He has not been taking NSAIDs or Celebrex for a while due to other reasons, but reports taking Tylenol every couple of hours for pain management. He has not undergone any PT for his shoulder condition yet. He has modified his activities by decreasing heavy lifting and focusing more on intensity, circuit training, and aesthetics in his workouts. He expresses concern about the potential impact of his shoulder condition on his approaching jail and his ability to continue working at his [employer].    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  - Hang is approaching jail  - Hang fell off a truck, suggesting physical work duties  - Hang has decreased heavy lifting and focuses more on intensity, circuit training, and aesthetics    SOCIAL HISTORY:  - Marital Status:       ROS:  Constitutional: +body aches  Musculoskeletal: +joint pain, +pain with movement          Past Medical History:   Diagnosis Date    Hypertension     Insomnia      Past Surgical History:   Procedure Laterality Date    COLONOSCOPY       Social History[1]    Current Medications[2]  Review of patient's allergies indicates:  No Known Allergies    /82 (BP  "Location: Right arm, Patient Position: Sitting)   Pulse 67   Ht 5' 10" (1.778 m)   Wt 87.4 kg (192 lb 9.6 oz)   BMI 27.64 kg/m²     Comprehensive review of systems completed and negative except as per HPI.        Objective:   General: Well-developed, well-nourished.  Neuro: Alert and oriented x 3.  Psych: Normal mood and affect.  Card: Regular rate and rhythm  Resp: Respirations regular and unlabored    Shoulder exam unchanged from previous visit.      Assessment:         1. Bicipital tendonitis of right shoulder    2. Arthrosis of right acromioclavicular joint          Plan:       Orders Placed This Encounter    Ambulatory Referral/Consult to Physical Therapy        Imaging and exam findings discussed.     Assessment & Plan    PROCEDURES:  - Potential surgical procedure to address partial rotator cuff tear and bone spur:  -  Shaving down the bone spur to prevent impingement  -  Potentially repairing the rotator cuff tear  - Full recovery from this procedure typically takes about 1 year.    MEDICATIONS:  - Take Tylenol every couple of hours as needed for pain.    REFERRALS:  - Referred to PT for rotator cuff stabilization and range of motion exercises.    FOLLOW UP:  - Follow up in 2 months.  - Contact the office if feeling great after therapy to transition to home exercises.  - Contact the office if no improvement to discuss surgical options.    PATIENT INSTRUCTIONS:  - Decrease heavy lifting.  - Focus on intensity and circuit training for aesthetics.               All questions were answered. Patient happy and in agreement with the plan.     This note was generated with the assistance of ambient listening technology. Verbal consent was obtained by the patient and accompanying visitor(s) for the recording of patient appointment to facilitate this note. I attest to having reviewed and edited the generated note for accuracy, though some syntax or spelling errors may persist. Please contact the author of this note " for any clarification.         [1]   Social History  Socioeconomic History    Marital status:    Tobacco Use    Smoking status: Never     Passive exposure: Never    Smokeless tobacco: Never   Substance and Sexual Activity    Alcohol use: Never    Drug use: Never    Sexual activity: Yes     Partners: Female     Birth control/protection: None   [2]   Current Outpatient Medications:     albuterol (ACCUNEB) 0.63 mg/3 mL Nebu, Take 0.63 mg by nebulization every 6 (six) hours as needed (as needed shortness of breath). Rescue, Disp: , Rfl:     amLODIPine (NORVASC) 5 MG tablet, Take 5 mg by mouth., Disp: , Rfl:     anastrozole (ARIMIDEX) 1 mg Tab, Take 1 mg by mouth twice a week., Disp: , Rfl:     celecoxib (CELEBREX) 200 MG capsule, Take 200 mg by mouth daily as needed., Disp: , Rfl:     KETOPROFEN, BULK, TOP, , Disp: , Rfl:     lisinopriL (PRINIVIL,ZESTRIL) 40 MG tablet, Take 40 mg by mouth., Disp: , Rfl:     mupirocin (BACTROBAN) 2 % ointment, Apply topically 2 (two) times daily., Disp: 110 g, Rfl: 0    semaglutide (OZEMPIC) 0.25 mg or 0.5 mg(2 mg/1.5 mL) pen injector, , Disp: , Rfl:     testosterone cypionate (DEPOTESTOTERONE CYPIONATE) 200 mg/mL injection, Inject 200 mg into the muscle every 7 days., Disp: , Rfl:     traZODone (DESYREL) 100 MG tablet, Take 100 mg by mouth every evening., Disp: , Rfl:     triamcinolone acetonide 0.1% (KENALOG) 0.1 % cream, Apply topically 2 (two) times daily. Apply in a thin layer to affected area, Disp: 30 g, Rfl: 0    zolpidem (AMBIEN) 10 mg Tab, Take 5 mg by mouth nightly as needed., Disp: , Rfl:     chlorproMAZINE (THORAZINE) 25 MG tablet, Take 1 tablet (25 mg total) by mouth 3 (three) times daily as needed (hiccups)., Disp: 9 tablet, Rfl: 0    promethazine-dextromethorphan (PROMETHAZINE-DM) 6.25-15 mg/5 mL Syrp, Take 5 mLs by mouth every 6 to 8 hours as needed (cough). May cause sedation.  Do not drive or operate heavy machinery after taking this medication. (Patient not  taking: Reported on 6/12/2025), Disp: 120 mL, Rfl: 0

## 2025-06-19 DIAGNOSIS — M75.21 BICIPITAL TENDONITIS OF RIGHT SHOULDER: Primary | ICD-10-CM

## 2025-06-19 DIAGNOSIS — M19.011 ARTHROSIS OF RIGHT ACROMIOCLAVICULAR JOINT: ICD-10-CM

## 2025-08-15 ENCOUNTER — OFFICE VISIT (OUTPATIENT)
Dept: ORTHOPEDICS | Facility: CLINIC | Age: 55
End: 2025-08-15
Payer: COMMERCIAL

## 2025-08-15 VITALS
WEIGHT: 193.81 LBS | BODY MASS INDEX: 27.75 KG/M2 | SYSTOLIC BLOOD PRESSURE: 125 MMHG | HEIGHT: 70 IN | HEART RATE: 45 BPM | DIASTOLIC BLOOD PRESSURE: 82 MMHG

## 2025-08-15 DIAGNOSIS — M75.21 BICIPITAL TENDONITIS OF RIGHT SHOULDER: ICD-10-CM

## 2025-08-15 DIAGNOSIS — M75.111 NONTRAUMATIC INCOMPLETE TEAR OF RIGHT ROTATOR CUFF: ICD-10-CM

## 2025-08-15 DIAGNOSIS — M19.011 ARTHROSIS OF RIGHT ACROMIOCLAVICULAR JOINT: ICD-10-CM

## 2025-08-15 DIAGNOSIS — M17.0 PRIMARY OSTEOARTHRITIS OF BOTH KNEES: Primary | ICD-10-CM

## 2025-08-15 PROCEDURE — 1159F MED LIST DOCD IN RCRD: CPT | Mod: CPTII,,, | Performed by: ORTHOPAEDIC SURGERY

## 2025-08-15 PROCEDURE — 99213 OFFICE O/P EST LOW 20 MIN: CPT | Mod: ,,, | Performed by: ORTHOPAEDIC SURGERY

## 2025-08-15 PROCEDURE — 4010F ACE/ARB THERAPY RXD/TAKEN: CPT | Mod: CPTII,,, | Performed by: ORTHOPAEDIC SURGERY

## 2025-08-15 PROCEDURE — 3079F DIAST BP 80-89 MM HG: CPT | Mod: CPTII,,, | Performed by: ORTHOPAEDIC SURGERY

## 2025-08-15 PROCEDURE — 3008F BODY MASS INDEX DOCD: CPT | Mod: CPTII,,, | Performed by: ORTHOPAEDIC SURGERY

## 2025-08-15 PROCEDURE — 3074F SYST BP LT 130 MM HG: CPT | Mod: CPTII,,, | Performed by: ORTHOPAEDIC SURGERY

## 2025-08-15 RX ORDER — ETODOLAC 400 MG/1
400 TABLET, FILM COATED ORAL 2 TIMES DAILY
Qty: 60 TABLET | Refills: 0 | Status: SHIPPED | OUTPATIENT
Start: 2025-08-15

## 2025-08-29 ENCOUNTER — OFFICE VISIT (OUTPATIENT)
Dept: ORTHOPEDICS | Facility: CLINIC | Age: 55
End: 2025-08-29
Payer: COMMERCIAL

## 2025-08-29 DIAGNOSIS — M17.0 PRIMARY OSTEOARTHRITIS OF BOTH KNEES: Primary | ICD-10-CM

## 2025-08-29 RX ORDER — BETAMETHASONE SODIUM PHOSPHATE AND BETAMETHASONE ACETATE 3; 3 MG/ML; MG/ML
6 INJECTION, SUSPENSION INTRA-ARTICULAR; INTRALESIONAL; INTRAMUSCULAR; SOFT TISSUE
Status: DISCONTINUED | OUTPATIENT
Start: 2025-08-29 | End: 2025-08-29 | Stop reason: HOSPADM

## 2025-08-29 RX ORDER — LIDOCAINE HYDROCHLORIDE 20 MG/ML
1 INJECTION, SOLUTION INFILTRATION; PERINEURAL
Status: DISCONTINUED | OUTPATIENT
Start: 2025-08-29 | End: 2025-08-29 | Stop reason: HOSPADM

## 2025-08-29 RX ADMIN — BETAMETHASONE SODIUM PHOSPHATE AND BETAMETHASONE ACETATE 6 MG: 3; 3 INJECTION, SUSPENSION INTRA-ARTICULAR; INTRALESIONAL; INTRAMUSCULAR; SOFT TISSUE at 09:08

## 2025-08-29 RX ADMIN — LIDOCAINE HYDROCHLORIDE 1 ML: 20 INJECTION, SOLUTION INFILTRATION; PERINEURAL at 09:08
